# Patient Record
Sex: FEMALE | Race: WHITE | NOT HISPANIC OR LATINO
[De-identification: names, ages, dates, MRNs, and addresses within clinical notes are randomized per-mention and may not be internally consistent; named-entity substitution may affect disease eponyms.]

---

## 2017-04-07 ENCOUNTER — CLINICAL ADVICE (OUTPATIENT)
Age: 62
End: 2017-04-07

## 2017-04-20 ENCOUNTER — APPOINTMENT (OUTPATIENT)
Dept: ORTHOPEDIC SURGERY | Facility: AMBULATORY SURGERY CENTER | Age: 62
End: 2017-04-20

## 2017-04-20 ENCOUNTER — OUTPATIENT (OUTPATIENT)
Dept: OUTPATIENT SERVICES | Facility: HOSPITAL | Age: 62
LOS: 1 days | Discharge: HOME | End: 2017-04-20
Payer: COMMERCIAL

## 2017-04-20 PROCEDURE — 26055 INCISE FINGER TENDON SHEATH: CPT | Mod: F3

## 2017-04-25 DIAGNOSIS — K21.9 GASTRO-ESOPHAGEAL REFLUX DISEASE WITHOUT ESOPHAGITIS: ICD-10-CM

## 2017-04-25 DIAGNOSIS — M13.88 OTHER SPECIFIED ARTHRITIS, OTHER SITE: ICD-10-CM

## 2017-04-25 DIAGNOSIS — Z79.899 OTHER LONG TERM (CURRENT) DRUG THERAPY: ICD-10-CM

## 2017-04-25 DIAGNOSIS — G47.33 OBSTRUCTIVE SLEEP APNEA (ADULT) (PEDIATRIC): ICD-10-CM

## 2017-04-25 DIAGNOSIS — M65.342 TRIGGER FINGER, LEFT RING FINGER: ICD-10-CM

## 2017-04-25 DIAGNOSIS — E03.8 OTHER SPECIFIED HYPOTHYROIDISM: ICD-10-CM

## 2017-04-25 DIAGNOSIS — M65.842 OTHER SYNOVITIS AND TENOSYNOVITIS, LEFT HAND: ICD-10-CM

## 2017-05-01 ENCOUNTER — APPOINTMENT (OUTPATIENT)
Dept: ORTHOPEDIC SURGERY | Facility: CLINIC | Age: 62
End: 2017-05-01

## 2017-05-10 ENCOUNTER — CLINICAL ADVICE (OUTPATIENT)
Age: 62
End: 2017-05-10

## 2017-05-22 ENCOUNTER — APPOINTMENT (OUTPATIENT)
Dept: ORTHOPEDIC SURGERY | Facility: CLINIC | Age: 62
End: 2017-05-22

## 2017-05-22 RX ORDER — CYCLOSPORINE 0.5 MG/ML
0.05 EMULSION OPHTHALMIC
Qty: 60 | Refills: 0 | Status: ACTIVE | COMMUNITY
Start: 2016-10-13

## 2017-05-24 ENCOUNTER — APPOINTMENT (OUTPATIENT)
Dept: CARDIOLOGY | Facility: CLINIC | Age: 62
End: 2017-05-24

## 2017-06-05 ENCOUNTER — APPOINTMENT (OUTPATIENT)
Dept: ORTHOPEDIC SURGERY | Facility: CLINIC | Age: 62
End: 2017-06-05

## 2017-06-05 VITALS — RESPIRATION RATE: 16 BRPM | WEIGHT: 165 LBS | BODY MASS INDEX: 32.39 KG/M2 | HEIGHT: 60 IN

## 2017-06-05 RX ORDER — OXYCODONE HYDROCHLORIDE 30 MG/1
30 TABLET, FILM COATED, EXTENDED RELEASE ORAL
Qty: 60 | Refills: 0 | Status: DISCONTINUED | COMMUNITY
Start: 2017-01-10 | End: 2017-06-05

## 2017-06-05 RX ORDER — METHYLPREDNISOLONE 4 MG/1
4 TABLET ORAL
Qty: 21 | Refills: 0 | Status: DISCONTINUED | COMMUNITY
Start: 2017-05-01 | End: 2017-06-05

## 2017-06-28 ENCOUNTER — CLINICAL ADVICE (OUTPATIENT)
Age: 62
End: 2017-06-28

## 2017-07-17 ENCOUNTER — APPOINTMENT (OUTPATIENT)
Dept: ORTHOPEDIC SURGERY | Facility: CLINIC | Age: 62
End: 2017-07-17

## 2017-07-27 ENCOUNTER — TRANSCRIPTION ENCOUNTER (OUTPATIENT)
Age: 62
End: 2017-07-27

## 2017-08-28 ENCOUNTER — APPOINTMENT (OUTPATIENT)
Dept: ORTHOPEDIC SURGERY | Facility: CLINIC | Age: 62
End: 2017-08-28
Payer: COMMERCIAL

## 2017-08-28 PROCEDURE — 20550 NJX 1 TENDON SHEATH/LIGAMENT: CPT | Mod: F2

## 2017-08-28 PROCEDURE — 99214 OFFICE O/P EST MOD 30 MIN: CPT | Mod: 25

## 2017-11-06 ENCOUNTER — APPOINTMENT (OUTPATIENT)
Dept: ORTHOPEDIC SURGERY | Facility: CLINIC | Age: 62
End: 2017-11-06
Payer: COMMERCIAL

## 2017-11-06 DIAGNOSIS — M65.342 TRIGGER FINGER, LEFT RING FINGER: ICD-10-CM

## 2017-11-06 PROCEDURE — 99214 OFFICE O/P EST MOD 30 MIN: CPT | Mod: 25

## 2017-11-06 PROCEDURE — 73140 X-RAY EXAM OF FINGER(S): CPT | Mod: F8

## 2017-11-06 PROCEDURE — 20550 NJX 1 TENDON SHEATH/LIGAMENT: CPT | Mod: F8

## 2018-01-05 ENCOUNTER — OUTPATIENT (OUTPATIENT)
Dept: OUTPATIENT SERVICES | Facility: HOSPITAL | Age: 63
LOS: 1 days | Discharge: HOME | End: 2018-01-05

## 2018-01-05 DIAGNOSIS — M65.871 OTHER SYNOVITIS AND TENOSYNOVITIS, RIGHT ANKLE AND FOOT: ICD-10-CM

## 2018-01-05 DIAGNOSIS — S69.80XA OTHER SPECIFIED INJURIES OF UNSPECIFIED WRIST, HAND AND FINGER(S), INITIAL ENCOUNTER: ICD-10-CM

## 2018-01-05 DIAGNOSIS — S93.401A SPRAIN OF UNSPECIFIED LIGAMENT OF RIGHT ANKLE, INITIAL ENCOUNTER: ICD-10-CM

## 2018-06-13 ENCOUNTER — APPOINTMENT (OUTPATIENT)
Dept: PLASTIC SURGERY | Facility: CLINIC | Age: 63
End: 2018-06-13
Payer: COMMERCIAL

## 2018-06-13 VITALS — HEIGHT: 60 IN | WEIGHT: 164 LBS | BODY MASS INDEX: 32.2 KG/M2

## 2018-06-13 DIAGNOSIS — M54.16 RADICULOPATHY, LUMBAR REGION: ICD-10-CM

## 2018-06-13 DIAGNOSIS — M54.12 RADICULOPATHY, CERVICAL REGION: ICD-10-CM

## 2018-06-13 DIAGNOSIS — M48.02 SPINAL STENOSIS, CERVICAL REGION: ICD-10-CM

## 2018-06-13 PROCEDURE — 99213 OFFICE O/P EST LOW 20 MIN: CPT | Mod: 25

## 2018-06-13 PROCEDURE — 20550 NJX 1 TENDON SHEATH/LIGAMENT: CPT | Mod: 59

## 2018-06-13 RX ORDER — TIZANIDINE 4 MG/1
4 TABLET ORAL
Qty: 90 | Refills: 0 | Status: DISCONTINUED | COMMUNITY
Start: 2016-12-06 | End: 2018-06-13

## 2018-06-13 RX ORDER — OXYMORPHONE HYDROCHLORIDE 20 MG/1
20 TABLET, EXTENDED RELEASE ORAL
Qty: 60 | Refills: 0 | Status: DISCONTINUED | COMMUNITY
Start: 2016-12-06 | End: 2018-06-13

## 2018-06-13 RX ORDER — TRIAMTERENE AND HYDROCHLOROTHIAZIDE 37.5; 25 MG/1; MG/1
37.5-25 CAPSULE ORAL
Qty: 30 | Refills: 0 | Status: DISCONTINUED | COMMUNITY
Start: 2017-05-16 | End: 2018-06-13

## 2018-06-13 RX ORDER — NAPROXEN SODIUM 500 MG/1
500 TABLET, FILM COATED, EXTENDED RELEASE ORAL
Qty: 60 | Refills: 0 | Status: DISCONTINUED | COMMUNITY
Start: 2017-01-03 | End: 2018-06-13

## 2018-06-13 RX ORDER — ALPRAZOLAM 0.25 MG/1
0.25 TABLET ORAL
Qty: 60 | Refills: 0 | Status: DISCONTINUED | COMMUNITY
Start: 2017-01-31 | End: 2018-06-13

## 2018-07-26 ENCOUNTER — APPOINTMENT (OUTPATIENT)
Dept: PLASTIC SURGERY | Facility: CLINIC | Age: 63
End: 2018-07-26
Payer: COMMERCIAL

## 2018-07-26 PROCEDURE — 20550 NJX 1 TENDON SHEATH/LIGAMENT: CPT

## 2018-09-06 ENCOUNTER — APPOINTMENT (OUTPATIENT)
Dept: PLASTIC SURGERY | Facility: CLINIC | Age: 63
End: 2018-09-06
Payer: COMMERCIAL

## 2018-09-06 DIAGNOSIS — Z86.39 PERSONAL HISTORY OF OTHER ENDOCRINE, NUTRITIONAL AND METABOLIC DISEASE: ICD-10-CM

## 2018-09-06 DIAGNOSIS — R73.03 PREDIABETES.: ICD-10-CM

## 2018-09-06 PROCEDURE — 99212 OFFICE O/P EST SF 10 MIN: CPT | Mod: 25

## 2018-09-06 PROCEDURE — 20550 NJX 1 TENDON SHEATH/LIGAMENT: CPT

## 2018-10-17 ENCOUNTER — APPOINTMENT (OUTPATIENT)
Dept: PLASTIC SURGERY | Facility: CLINIC | Age: 63
End: 2018-10-17

## 2019-03-22 ENCOUNTER — APPOINTMENT (OUTPATIENT)
Dept: PLASTIC SURGERY | Facility: CLINIC | Age: 64
End: 2019-03-22
Payer: COMMERCIAL

## 2019-03-22 PROCEDURE — 99212 OFFICE O/P EST SF 10 MIN: CPT | Mod: 25

## 2019-03-22 PROCEDURE — 20550 NJX 1 TENDON SHEATH/LIGAMENT: CPT

## 2019-03-22 NOTE — PHYSICAL EXAM
[de-identified] : well-appearing, NAD [de-identified] : right hand: well healed volar scar of 4th MCP, no triggering. palpable 3rd digit palmar nodule on flexion and extension with mild triggering and tenderness. small palpable cords of 3rd and 4th palmar ray. Negative Hueston's test; no flexion deformity of MCP or PIP\par left hand: no triggering of 4th digit; palpable nodule with mild triggering of 3rd digit

## 2019-03-22 NOTE — HISTORY OF PRESENT ILLNESS
[FreeTextEntry1] : 62 yo F with PMH of Hypothyroidism and lumbar and cervical radiculopathy RHD with prior trigger finger release of left 4th digit with Dr. Covington in 4/2017 who presents with c/o right middle and 4th digit pain and triggering for approximately 3 weeks associated with stiffness. Patient reports prior steroid injection to right middle finger with improvement. Patient denies any hand trauma. \par \par Interval history (7/26/18): Here for 6 week follow up after kenalog injection to right 3rd and 4th digit.  She reports improvement of her trigger fingers; denies locking/clicking.  She endorses that she has prior kenalog injection to right 3rd digit for a total of 2 injections.  She has new complaint of left 3rd digit triggering and associated tenderness.  Denies fevers, chills, redness. \par \par Interval history (9/6/18): here for interval followup after trigger finger kenalog injection of left 3rd digit.  She reports improvement of triggering symptoms since the steroid injection, but has noted return of increased stiffness and mild triggering.  She denies locking up.  Here to discuss possible steroid treatment of left 3rd digit trigger digit.  Also inquiring whether she might have Dupretryn's disease of the left hand\par \par Interval hx (3/22/19): Here for follow up of c/o right 3rd and 4th digit triggering and associating pain.  also persistent left 3rd digit triggering and discomfort.  Evaluated by Rheum, negative for RA on blood work. Had hand x-rays performed which demonstrated degenerative changes.\par

## 2019-03-22 NOTE — ASSESSMENT
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 surgical trigger finger release release.\par -Recommended TFR; emphasized likelihood of recurrent trigger finger after kenlog injection.  Patient declined surgical intervention at this time for the left 3rd digit trigger finger and right 3rd and 4th digit trigger finger. Desires kenalog injection due to current job situation and will need to plan surgery at a later date.\par -Informed consent obtained\par -Pt tolerated steroid injection\par -Follow up in 6 weeks for discussion of trigger finger release and scheduling

## 2019-03-22 NOTE — PROCEDURE
[Nl] : None [FreeTextEntry1] : right 3rd and 4th trigger finger [FreeTextEntry2] : right 3rd and 4th trigger finger steroid injection [FreeTextEntry3] : cold refrigerant [FreeTextEntry6] : The benefits, risks, and outcomes of kenalog injection were discussed. The risks include infection, hypopigmentation, poor wound healing, fat atrophy, hyperglycemia and dissatisfaction with outcome. The patient understood the risks and elected to proceed with steroid injection.\par \par The right 3rd and 4th finger was prepared in sterile fashion. \par The injection site was identified and marked. \par Triamcinolone 5 mg was injected into the flexor tendon sheath without issue. \par The patient tolerated the procedure.

## 2019-04-17 ENCOUNTER — OUTPATIENT (OUTPATIENT)
Dept: OUTPATIENT SERVICES | Facility: HOSPITAL | Age: 64
LOS: 1 days | Discharge: HOME | End: 2019-04-17
Payer: COMMERCIAL

## 2019-04-17 DIAGNOSIS — H90.A21 SENSORINEURAL HEARING LOSS, UNILATERAL, RIGHT EAR, WITH RESTRICTED HEARING ON THE CONTRALATERAL SIDE: ICD-10-CM

## 2019-04-17 PROCEDURE — 70553 MRI BRAIN STEM W/O & W/DYE: CPT | Mod: 26

## 2019-05-06 ENCOUNTER — APPOINTMENT (OUTPATIENT)
Dept: PLASTIC SURGERY | Facility: CLINIC | Age: 64
End: 2019-05-06

## 2019-08-12 ENCOUNTER — APPOINTMENT (OUTPATIENT)
Dept: PLASTIC SURGERY | Facility: CLINIC | Age: 64
End: 2019-08-12
Payer: COMMERCIAL

## 2019-08-12 DIAGNOSIS — M65.321 TRIGGER FINGER, RIGHT INDEX FINGER: ICD-10-CM

## 2019-08-12 PROCEDURE — 99213 OFFICE O/P EST LOW 20 MIN: CPT | Mod: 25

## 2019-08-12 PROCEDURE — 20550 NJX 1 TENDON SHEATH/LIGAMENT: CPT | Mod: F6

## 2019-08-12 NOTE — PHYSICAL EXAM
[de-identified] : well-appearing, NAD [de-identified] : right hand: well healed volar scar of 4th MCP, + triggering. palpable 3rd digit palmar nodule on flexion and extension with triggering and tenderness; 2nd digit tender palpable nodule +triggering; small palpable cords of 3rd and 4th palmar ray. Negative Hueston's test; no flexion deformity of MCP or PIP\par left hand: no triggering of 4th digit; palpable nodule with no triggering of 3rd digit

## 2019-08-12 NOTE — PROCEDURE
[Nl] : None [FreeTextEntry1] : right 2nd trigger finger [FreeTextEntry2] : right 2nd trigger finger kenalog injection [FreeTextEntry6] : The benefits, risks, and outcomes of kenalog injection were discussed. The risks include infection, hypopigmentation, poor wound healing, fat atrophy, hyperglycemia and dissatisfaction with outcome. The patient understood the risks and elected to proceed with steroid injection.\par \par The right index finger was prepared in sterile fashion. \par The injection site was identified and marked. \par Triamcinolone 1 mL was injected into the flexor tendon sheath without issue. \par The patient tolerated the procedure.

## 2019-08-12 NOTE — ASSESSMENT
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 surgical trigger finger release release.\par -Recommended TFR of right 3rd and 4th trigger finger.  Beneits, risks and outcomes discussed in detail.\par -Regarding right index trigger finger, recommend kenalog injection.\par -Informed consent obtained for right 3rd & 4th trigger finger release under IV sedation as outpatient SAMINA\par -Pt tolerated steroid injection fo right index trigger finger\par -Will schedule SAMINA procedure at patient's earliest convenience after medical/cardiac clearance

## 2019-08-12 NOTE — HISTORY OF PRESENT ILLNESS
[FreeTextEntry1] : 64 yo F with PMH of Hypothyroidism and lumbar and cervical radiculopathy RHD with prior trigger finger release of left 4th digit with Dr. Covington in 4/2017 who presents with c/o right middle and 4th digit pain and triggering for approximately 3 weeks associated with stiffness. Patient reports prior steroid injection to right middle finger with improvement. Patient denies any hand trauma. \par \par Interval history (7/26/18): Here for 6 week follow up after kenalog injection to right 3rd and 4th digit.  She reports improvement of her trigger fingers; denies locking/clicking.  She endorses that she has prior kenalog injection to right 3rd digit for a total of 2 injections.  She has new complaint of left 3rd digit triggering and associated tenderness.  Denies fevers, chills, redness. \par \par Interval history (9/6/18): here for interval followup after trigger finger kenalog injection of left 3rd digit.  She reports improvement of triggering symptoms since the steroid injection, but has noted return of increased stiffness and mild triggering.  She denies locking up.  Here to discuss possible steroid treatment of left 3rd digit trigger digit.  Also inquiring whether she might have Dupretryn's disease of the left hand\par \par Interval hx (3/22/19): Here for follow up of c/o right 3rd and 4th digit triggering and associating pain.  also persistent left 3rd digit triggering and discomfort.  Evaluated by Rheum, negative for RA on blood work. Had hand x-rays performed which demonstrated degenerative changes.\par \par Interval hx (8/12/19): Here for follow up and discussion of surgical treatment options.  Still c/o right 3rd & 4th digit triggering and associated pain.  New complaint of right 2nd digit pain and occasional triggering.  No new changes in health. No ASA

## 2019-08-15 ENCOUNTER — APPOINTMENT (OUTPATIENT)
Dept: CARDIOLOGY | Facility: CLINIC | Age: 64
End: 2019-08-15
Payer: COMMERCIAL

## 2019-08-15 ENCOUNTER — OUTPATIENT (OUTPATIENT)
Dept: OUTPATIENT SERVICES | Facility: HOSPITAL | Age: 64
LOS: 1 days | Discharge: HOME | End: 2019-08-15
Payer: COMMERCIAL

## 2019-08-15 VITALS
TEMPERATURE: 98 F | WEIGHT: 171.52 LBS | SYSTOLIC BLOOD PRESSURE: 137 MMHG | OXYGEN SATURATION: 99 % | RESPIRATION RATE: 15 BRPM | HEART RATE: 16 BPM | DIASTOLIC BLOOD PRESSURE: 75 MMHG

## 2019-08-15 DIAGNOSIS — M65.331 TRIGGER FINGER, RIGHT MIDDLE FINGER: ICD-10-CM

## 2019-08-15 DIAGNOSIS — M65.341 TRIGGER FINGER, RIGHT RING FINGER: ICD-10-CM

## 2019-08-15 DIAGNOSIS — Z90.710 ACQUIRED ABSENCE OF BOTH CERVIX AND UTERUS: Chronic | ICD-10-CM

## 2019-08-15 DIAGNOSIS — Z01.818 ENCOUNTER FOR OTHER PREPROCEDURAL EXAMINATION: ICD-10-CM

## 2019-08-15 LAB
ALBUMIN SERPL ELPH-MCNC: 4.8 G/DL — SIGNIFICANT CHANGE UP (ref 3.5–5.2)
ALP SERPL-CCNC: 66 U/L — SIGNIFICANT CHANGE UP (ref 30–115)
ALT FLD-CCNC: 19 U/L — SIGNIFICANT CHANGE UP (ref 0–41)
ANION GAP SERPL CALC-SCNC: 13 MMOL/L — SIGNIFICANT CHANGE UP (ref 7–14)
AST SERPL-CCNC: 18 U/L — SIGNIFICANT CHANGE UP (ref 0–41)
BASOPHILS # BLD AUTO: 0.06 K/UL — SIGNIFICANT CHANGE UP (ref 0–0.2)
BASOPHILS NFR BLD AUTO: 0.7 % — SIGNIFICANT CHANGE UP (ref 0–1)
BILIRUB SERPL-MCNC: 0.2 MG/DL — SIGNIFICANT CHANGE UP (ref 0.2–1.2)
BUN SERPL-MCNC: 15 MG/DL — SIGNIFICANT CHANGE UP (ref 10–20)
CALCIUM SERPL-MCNC: 9.3 MG/DL — SIGNIFICANT CHANGE UP (ref 8.5–10.1)
CHLORIDE SERPL-SCNC: 103 MMOL/L — SIGNIFICANT CHANGE UP (ref 98–110)
CO2 SERPL-SCNC: 26 MMOL/L — SIGNIFICANT CHANGE UP (ref 17–32)
CREAT SERPL-MCNC: 0.9 MG/DL — SIGNIFICANT CHANGE UP (ref 0.7–1.5)
EOSINOPHIL # BLD AUTO: 0.18 K/UL — SIGNIFICANT CHANGE UP (ref 0–0.7)
EOSINOPHIL NFR BLD AUTO: 2.1 % — SIGNIFICANT CHANGE UP (ref 0–8)
GLUCOSE SERPL-MCNC: 82 MG/DL — SIGNIFICANT CHANGE UP (ref 70–99)
HCT VFR BLD CALC: 42.2 % — SIGNIFICANT CHANGE UP (ref 37–47)
HGB BLD-MCNC: 13.6 G/DL — SIGNIFICANT CHANGE UP (ref 12–16)
IMM GRANULOCYTES NFR BLD AUTO: 0.7 % — HIGH (ref 0.1–0.3)
LYMPHOCYTES # BLD AUTO: 2.45 K/UL — SIGNIFICANT CHANGE UP (ref 1.2–3.4)
LYMPHOCYTES # BLD AUTO: 28.9 % — SIGNIFICANT CHANGE UP (ref 20.5–51.1)
MCHC RBC-ENTMCNC: 28.8 PG — SIGNIFICANT CHANGE UP (ref 27–31)
MCHC RBC-ENTMCNC: 32.2 G/DL — SIGNIFICANT CHANGE UP (ref 32–37)
MCV RBC AUTO: 89.2 FL — SIGNIFICANT CHANGE UP (ref 81–99)
MONOCYTES # BLD AUTO: 0.47 K/UL — SIGNIFICANT CHANGE UP (ref 0.1–0.6)
MONOCYTES NFR BLD AUTO: 5.5 % — SIGNIFICANT CHANGE UP (ref 1.7–9.3)
NEUTROPHILS # BLD AUTO: 5.25 K/UL — SIGNIFICANT CHANGE UP (ref 1.4–6.5)
NEUTROPHILS NFR BLD AUTO: 62.1 % — SIGNIFICANT CHANGE UP (ref 42.2–75.2)
NRBC # BLD: 0 /100 WBCS — SIGNIFICANT CHANGE UP (ref 0–0)
PLATELET # BLD AUTO: 272 K/UL — SIGNIFICANT CHANGE UP (ref 130–400)
POTASSIUM SERPL-MCNC: 4.6 MMOL/L — SIGNIFICANT CHANGE UP (ref 3.5–5)
POTASSIUM SERPL-SCNC: 4.6 MMOL/L — SIGNIFICANT CHANGE UP (ref 3.5–5)
PROT SERPL-MCNC: 7.5 G/DL — SIGNIFICANT CHANGE UP (ref 6–8)
RBC # BLD: 4.73 M/UL — SIGNIFICANT CHANGE UP (ref 4.2–5.4)
RBC # FLD: 13.5 % — SIGNIFICANT CHANGE UP (ref 11.5–14.5)
SODIUM SERPL-SCNC: 142 MMOL/L — SIGNIFICANT CHANGE UP (ref 135–146)
WBC # BLD: 8.47 K/UL — SIGNIFICANT CHANGE UP (ref 4.8–10.8)
WBC # FLD AUTO: 8.47 K/UL — SIGNIFICANT CHANGE UP (ref 4.8–10.8)

## 2019-08-15 PROCEDURE — 93000 ELECTROCARDIOGRAM COMPLETE: CPT

## 2019-08-15 PROCEDURE — 93010 ELECTROCARDIOGRAM REPORT: CPT

## 2019-08-15 PROCEDURE — 99214 OFFICE O/P EST MOD 30 MIN: CPT | Mod: 25

## 2019-08-15 NOTE — H&P PST ADULT - REASON FOR ADMISSION
PT PRESENTS TO PAST WITH NO SOB, CP, PALPITATIONS, DYSURIA, UTI OR URI AT PRESENT.   PT ABLE TO WALK UP 1--  FLIGHTS OF STEPS WITH NO SOB.  AS PER THE PT, THIS IS HIS/HER COMPLETE MEDICAL AND SURGICAL HX, INCLUDING MEDICATIONS PRESCRIBED AND OVER THE COUNTER  PT PRESENTS TO PAST WITH H/O-  RIGHT 3RD AND 4TH TRIGGER FINGER.

## 2019-08-15 NOTE — H&P PST ADULT - RS GEN PE MLT RESP DETAILS PC
normal/no chest wall tenderness/no intercostal retractions/clear to auscultation bilaterally/airway patent/good air movement/breath sounds equal/respirations non-labored

## 2019-08-23 ENCOUNTER — APPOINTMENT (OUTPATIENT)
Dept: CARDIOLOGY | Facility: CLINIC | Age: 64
End: 2019-08-23
Payer: COMMERCIAL

## 2019-08-23 PROCEDURE — 93880 EXTRACRANIAL BILAT STUDY: CPT

## 2019-08-26 NOTE — ASU PATIENT PROFILE, ADULT - PMH
GERD (gastroesophageal reflux disease)    Hypercholesterolemia    Hypothyroidism    OA (osteoarthritis)    BLAINE on CPAP

## 2019-08-27 ENCOUNTER — OUTPATIENT (OUTPATIENT)
Dept: OUTPATIENT SERVICES | Facility: HOSPITAL | Age: 64
LOS: 1 days | Discharge: HOME | End: 2019-08-27

## 2019-08-27 ENCOUNTER — APPOINTMENT (OUTPATIENT)
Dept: PLASTIC SURGERY | Facility: AMBULATORY SURGERY CENTER | Age: 64
End: 2019-08-27
Payer: COMMERCIAL

## 2019-08-27 VITALS
RESPIRATION RATE: 18 BRPM | WEIGHT: 171.52 LBS | SYSTOLIC BLOOD PRESSURE: 133 MMHG | HEART RATE: 80 BPM | DIASTOLIC BLOOD PRESSURE: 67 MMHG | TEMPERATURE: 98 F | OXYGEN SATURATION: 95 %

## 2019-08-27 VITALS
RESPIRATION RATE: 24 BRPM | OXYGEN SATURATION: 95 % | SYSTOLIC BLOOD PRESSURE: 112 MMHG | DIASTOLIC BLOOD PRESSURE: 67 MMHG | HEART RATE: 78 BPM

## 2019-08-27 DIAGNOSIS — Z90.710 ACQUIRED ABSENCE OF BOTH CERVIX AND UTERUS: Chronic | ICD-10-CM

## 2019-08-27 PROCEDURE — 26055 INCISE FINGER TENDON SHEATH: CPT

## 2019-08-27 RX ORDER — OXYCODONE HYDROCHLORIDE 5 MG/1
1 TABLET ORAL
Qty: 0 | Refills: 0 | DISCHARGE

## 2019-08-27 RX ORDER — ROSUVASTATIN CALCIUM 5 MG/1
1 TABLET ORAL
Qty: 0 | Refills: 0 | DISCHARGE

## 2019-08-27 RX ORDER — ESOMEPRAZOLE MAGNESIUM 40 MG/1
1 CAPSULE, DELAYED RELEASE ORAL
Qty: 0 | Refills: 0 | DISCHARGE

## 2019-08-27 RX ORDER — ZOLPIDEM TARTRATE 10 MG/1
1 TABLET ORAL
Qty: 0 | Refills: 0 | DISCHARGE

## 2019-08-27 RX ORDER — HYDROMORPHONE HYDROCHLORIDE 2 MG/ML
0.5 INJECTION INTRAMUSCULAR; INTRAVENOUS; SUBCUTANEOUS
Refills: 0 | Status: DISCONTINUED | OUTPATIENT
Start: 2019-08-27 | End: 2019-08-27

## 2019-08-27 RX ORDER — OXYCODONE AND ACETAMINOPHEN 5; 325 MG/1; MG/1
1 TABLET ORAL ONCE
Refills: 0 | Status: DISCONTINUED | OUTPATIENT
Start: 2019-08-27 | End: 2019-08-27

## 2019-08-27 RX ORDER — PREGABALIN 225 MG/1
1 CAPSULE ORAL
Qty: 0 | Refills: 0 | DISCHARGE

## 2019-08-27 RX ORDER — CHOLECALCIFEROL (VITAMIN D3) 125 MCG
1 CAPSULE ORAL
Qty: 0 | Refills: 0 | DISCHARGE

## 2019-08-27 RX ORDER — LEVOTHYROXINE SODIUM 125 MCG
1 TABLET ORAL
Qty: 0 | Refills: 0 | DISCHARGE

## 2019-08-27 RX ORDER — SODIUM CHLORIDE 9 MG/ML
1000 INJECTION, SOLUTION INTRAVENOUS
Refills: 0 | Status: DISCONTINUED | OUTPATIENT
Start: 2019-08-27 | End: 2019-09-11

## 2019-08-27 RX ORDER — ONDANSETRON 8 MG/1
4 TABLET, FILM COATED ORAL ONCE
Refills: 0 | Status: DISCONTINUED | OUTPATIENT
Start: 2019-08-27 | End: 2019-09-11

## 2019-08-27 RX ADMIN — SODIUM CHLORIDE 100 MILLILITER(S): 9 INJECTION, SOLUTION INTRAVENOUS at 08:59

## 2019-08-27 NOTE — BRIEF OPERATIVE NOTE - NSICDXBRIEFPOSTOP_GEN_ALL_CORE_FT
POST-OP DIAGNOSIS:  Trigger finger 27-Aug-2019 08:25:37 right third and fourth digits Rissa Gonzalez L

## 2019-08-27 NOTE — CHART NOTE - NSCHARTNOTEFT_GEN_A_CORE
PACU ANESTHESIA ADMISSION NOTE      Procedure: Trigger finger release: right 3rd and 4th digits    Post op diagnosis:  Trigger finger      ____  Intubated  TV:______       Rate: ______      FiO2: ______    _x___  Patent Airway    _x___  Full return of protective reflexes    _x___  Full recovery from anesthesia / back to baseline status    Vitals:            T:  97.7              BP :     105/56           R: 18             Sat:98               P:83      Mental Status:  _x___ Awake   _____ Alert   _____ Drowsy   _____ Sedated    Nausea/Vomiting:  _x___  NO       ______Yes,   See Post - Op Orders         Pain Scale (0-10):  __0___    Treatment: _x___ None    ____ See Post - Op/PCA Orders    Post - Operative Fluids:   __x__ Oral   ____ See Post - Op Orders    Plan: Discharge:   _x___Home       _____Floor     _____Critical Care    _____  Other:_________________    Comments:  No anesthesia issues or complications noted.  Discharge when criteria met.

## 2019-08-27 NOTE — BRIEF OPERATIVE NOTE - NSICDXBRIEFPREOP_GEN_ALL_CORE_FT
PRE-OP DIAGNOSIS:  Trigger finger 27-Aug-2019 08:25:29 right third and fourth digits Rissa Gonzalez L

## 2019-08-28 PROBLEM — K21.9 GASTRO-ESOPHAGEAL REFLUX DISEASE WITHOUT ESOPHAGITIS: Chronic | Status: ACTIVE | Noted: 2019-08-15

## 2019-08-28 PROBLEM — E03.9 HYPOTHYROIDISM, UNSPECIFIED: Chronic | Status: ACTIVE | Noted: 2019-08-15

## 2019-08-28 PROBLEM — M19.90 UNSPECIFIED OSTEOARTHRITIS, UNSPECIFIED SITE: Chronic | Status: ACTIVE | Noted: 2019-08-15

## 2019-08-28 PROBLEM — G47.33 OBSTRUCTIVE SLEEP APNEA (ADULT) (PEDIATRIC): Chronic | Status: ACTIVE | Noted: 2019-08-15

## 2019-08-28 PROBLEM — E78.00 PURE HYPERCHOLESTEROLEMIA, UNSPECIFIED: Chronic | Status: ACTIVE | Noted: 2019-08-15

## 2019-08-29 ENCOUNTER — APPOINTMENT (OUTPATIENT)
Dept: PLASTIC SURGERY | Facility: CLINIC | Age: 64
End: 2019-08-29
Payer: COMMERCIAL

## 2019-08-29 DIAGNOSIS — G47.33 OBSTRUCTIVE SLEEP APNEA (ADULT) (PEDIATRIC): ICD-10-CM

## 2019-08-29 DIAGNOSIS — K21.9 GASTRO-ESOPHAGEAL REFLUX DISEASE WITHOUT ESOPHAGITIS: ICD-10-CM

## 2019-08-29 DIAGNOSIS — M65.331 TRIGGER FINGER, RIGHT MIDDLE FINGER: ICD-10-CM

## 2019-08-29 DIAGNOSIS — M19.90 UNSPECIFIED OSTEOARTHRITIS, UNSPECIFIED SITE: ICD-10-CM

## 2019-08-29 DIAGNOSIS — E78.00 PURE HYPERCHOLESTEROLEMIA, UNSPECIFIED: ICD-10-CM

## 2019-08-29 DIAGNOSIS — M65.332 TRIGGER FINGER, LEFT MIDDLE FINGER: ICD-10-CM

## 2019-08-29 DIAGNOSIS — E03.9 HYPOTHYROIDISM, UNSPECIFIED: ICD-10-CM

## 2019-08-29 DIAGNOSIS — M65.341 TRIGGER FINGER, RIGHT RING FINGER: ICD-10-CM

## 2019-08-29 PROCEDURE — 99024 POSTOP FOLLOW-UP VISIT: CPT

## 2019-08-29 NOTE — ASSESSMENT
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

## 2019-08-29 NOTE — HISTORY OF PRESENT ILLNESS
[FreeTextEntry1] : 62 yo F with PMH of Hypothyroidism and lumbar and cervical radiculopathy RHD with prior trigger finger release of left 4th digit with Dr. Covington in 4/2017 who presents with c/o right middle and 4th digit pain and triggering for approximately 3 weeks associated with stiffness. Patient reports prior steroid injection to right middle finger with improvement. Patient denies any hand trauma. \par \par Interval history (7/26/18): Here for 6 week follow up after kenalog injection to right 3rd and 4th digit.  She reports improvement of her trigger fingers; denies locking/clicking.  She endorses that she has prior kenalog injection to right 3rd digit for a total of 2 injections.  She has new complaint of left 3rd digit triggering and associated tenderness.  Denies fevers, chills, redness. \par \par Interval history (9/6/18): here for interval followup after trigger finger kenalog injection of left 3rd digit.  She reports improvement of triggering symptoms since the steroid injection, but has noted return of increased stiffness and mild triggering.  She denies locking up.  Here to discuss possible steroid treatment of left 3rd digit trigger digit.  Also inquiring whether she might have Dupretryn's disease of the left hand\par \par Interval hx (3/22/19): Here for follow up of c/o right 3rd and 4th digit triggering and associating pain.  also persistent left 3rd digit triggering and discomfort.  Evaluated by Rheum, negative for RA on blood work. Had hand x-rays performed which demonstrated degenerative changes.\par \par Interval hx (8/12/19): Here for follow up and discussion of surgical treatment options.  Still c/o right 3rd & 4th digit triggering and associated pain.  New complaint of right 2nd digit pain and occasional triggering.  No new changes in health. No ASA.\par \par Interval hx (8/29/19): Pt presents for postop check and bandage change- POD #2 s/p right 3rd and 4th TFR. C/o pain moderately controlled with home pain medication (Oxycodone 20mg q8h) as well as swelling and bruising. Denies f/c.

## 2019-08-29 NOTE — PHYSICAL EXAM
[de-identified] : well-appearing, NAD [de-identified] : right hand: well healed volar scar of 4th MCP, + triggering. palpable 3rd digit palmar nodule on flexion and extension with triggering and tenderness; 2nd digit tender palpable nodule +triggering; small palpable cords of 3rd and 4th palmar ray. Negative Hueston's test; no flexion deformity of MCP or PIP\par left hand: no triggering of 4th digit; palpable nodule with no triggering of 3rd digit

## 2019-09-04 ENCOUNTER — APPOINTMENT (OUTPATIENT)
Dept: PLASTIC SURGERY | Facility: CLINIC | Age: 64
End: 2019-09-04
Payer: COMMERCIAL

## 2019-09-04 PROCEDURE — 99024 POSTOP FOLLOW-UP VISIT: CPT

## 2019-09-04 NOTE — HISTORY OF PRESENT ILLNESS
[FreeTextEntry1] : 62 yo F with PMH of Hypothyroidism and lumbar and cervical radiculopathy RHD with prior trigger finger release of left 4th digit with Dr. Covington in 4/2017 who presents with c/o right middle and 4th digit pain and triggering for approximately 3 weeks associated with stiffness. Patient reports prior steroid injection to right middle finger with improvement. Patient denies any hand trauma. \par \par Interval history (7/26/18): Here for 6 week follow up after kenalog injection to right 3rd and 4th digit.  She reports improvement of her trigger fingers; denies locking/clicking.  She endorses that she has prior kenalog injection to right 3rd digit for a total of 2 injections.  She has new complaint of left 3rd digit triggering and associated tenderness.  Denies fevers, chills, redness. \par \par Interval history (9/6/18): here for interval followup after trigger finger kenalog injection of left 3rd digit.  She reports improvement of triggering symptoms since the steroid injection, but has noted return of increased stiffness and mild triggering.  She denies locking up.  Here to discuss possible steroid treatment of left 3rd digit trigger digit.  Also inquiring whether she might have Dupretryn's disease of the left hand\par \par Interval hx (3/22/19): Here for follow up of c/o right 3rd and 4th digit triggering and associating pain.  also persistent left 3rd digit triggering and discomfort.  Evaluated by Rheum, negative for RA on blood work. Had hand x-rays performed which demonstrated degenerative changes.\par \par Interval hx (8/12/19): Here for follow up and discussion of surgical treatment options.  Still c/o right 3rd & 4th digit triggering and associated pain.  New complaint of right 2nd digit pain and occasional triggering.  No new changes in health. No ASA.\par \par Interval hx (8/29/19): Pt presents for postop check and bandage change- POD #2 s/p right 3rd and 4th TFR. C/o pain moderately controlled with home pain medication (Oxycodone 20mg q8h) as well as swelling and bruising. Denies f/c.\par \par Interval hx (9/4/19): Pt presents for f/u- POD #8.

## 2019-09-04 NOTE — ASSESSMENT
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

## 2019-09-04 NOTE — PHYSICAL EXAM
[de-identified] : well-appearing, NAD [de-identified] : right hand: well healed volar scar of 4th MCP, healing incisions over 3rd and 4th A1 pulleys, no s/s cellulitis, mild swelling and tenderness as expected; 2nd digit tender palpable nodule +triggering; small palpable cords of 3rd and 4th palmar ray. Negative Hueston's test; no flexion deformity of MCP or PIP\par left hand: no triggering of 4th digit; palpable nodule with no triggering of 3rd digit

## 2019-09-09 ENCOUNTER — APPOINTMENT (OUTPATIENT)
Dept: CARDIOLOGY | Facility: CLINIC | Age: 64
End: 2019-09-09

## 2019-09-11 ENCOUNTER — APPOINTMENT (OUTPATIENT)
Dept: PLASTIC SURGERY | Facility: CLINIC | Age: 64
End: 2019-09-11
Payer: COMMERCIAL

## 2019-09-11 PROCEDURE — 99024 POSTOP FOLLOW-UP VISIT: CPT

## 2019-09-11 NOTE — HISTORY OF PRESENT ILLNESS
[FreeTextEntry1] : 65 yo F with PMH of Hypothyroidism and lumbar and cervical radiculopathy RHD with prior trigger finger release of left 4th digit with Dr. Covington in 4/2017 who presents with c/o right middle and 4th digit pain and triggering for approximately 3 weeks associated with stiffness. Patient reports prior steroid injection to right middle finger with improvement. Patient denies any hand trauma. \par \par Interval history (7/26/18): Here for 6 week follow up after kenalog injection to right 3rd and 4th digit.  She reports improvement of her trigger fingers; denies locking/clicking.  She endorses that she has prior kenalog injection to right 3rd digit for a total of 2 injections.  She has new complaint of left 3rd digit triggering and associated tenderness.  Denies fevers, chills, redness. \par \par Interval history (9/6/18): here for interval followup after trigger finger kenalog injection of left 3rd digit.  She reports improvement of triggering symptoms since the steroid injection, but has noted return of increased stiffness and mild triggering.  She denies locking up.  Here to discuss possible steroid treatment of left 3rd digit trigger digit.  Also inquiring whether she might have Dupretryn's disease of the left hand\par \par Interval hx (3/22/19): Here for follow up of c/o right 3rd and 4th digit triggering and associating pain.  also persistent left 3rd digit triggering and discomfort.  Evaluated by Rheum, negative for RA on blood work. Had hand x-rays performed which demonstrated degenerative changes.\par \par Interval hx (8/12/19): Here for follow up and discussion of surgical treatment options.  Still c/o right 3rd & 4th digit triggering and associated pain.  New complaint of right 2nd digit pain and occasional triggering.  No new changes in health. No ASA.\par \par Interval hx (9/11/19): Pt presents today POD #13 s/p right 3rd and 4th TFR c/o improving incisional discomfort. Compliant with hand rest and elevation. Denies f/c.

## 2019-09-11 NOTE — PHYSICAL EXAM
[de-identified] : right hand: healing incisions over 3rd and 4th A1 pulley, clean, dry and intact with swelling as expected, no erythema or cellulitic changes, limited flexion and extension of digits secondary to soft tissue swelling \par left hand: no triggering of 4th digit; palpable nodule with no triggering of 3rd digit  [de-identified] : well-appearing, very anxious

## 2019-09-11 NOTE — ASSESSMENT
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 Shah

## 2019-10-03 ENCOUNTER — APPOINTMENT (OUTPATIENT)
Dept: PLASTIC SURGERY | Facility: CLINIC | Age: 64
End: 2019-10-03
Payer: COMMERCIAL

## 2019-10-03 PROCEDURE — 99024 POSTOP FOLLOW-UP VISIT: CPT

## 2019-10-03 NOTE — ASSESSMENT
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 Shah as previously scheduled

## 2019-10-03 NOTE — PHYSICAL EXAM
[de-identified] : well-appearing, very anxious  [de-identified] : right hand: healed incisions over 3rd and 4th A1 pulley, clean, dry and intact with diffuse swelling of entire hand, no erythema or cellulitic changes, limited flexion and extension of digits secondary to soft tissue swelling, intact sensation \par left hand: no triggering of 4th digit; palpable nodule with no triggering of 3rd digit

## 2019-10-03 NOTE — HISTORY OF PRESENT ILLNESS
[FreeTextEntry1] : 65 yo F with PMH of Hypothyroidism and lumbar and cervical radiculopathy RHD with prior trigger finger release of left 4th digit with Dr. Covington in 4/2017 who presents with c/o right middle and 4th digit pain and triggering for approximately 3 weeks associated with stiffness. Patient reports prior steroid injection to right middle finger with improvement. Patient denies any hand trauma. \par \par Interval history (7/26/18): Here for 6 week follow up after kenalog injection to right 3rd and 4th digit.  She reports improvement of her trigger fingers; denies locking/clicking.  She endorses that she has prior kenalog injection to right 3rd digit for a total of 2 injections.  She has new complaint of left 3rd digit triggering and associated tenderness.  Denies fevers, chills, redness. \par \par Interval history (9/6/18): here for interval followup after trigger finger kenalog injection of left 3rd digit.  She reports improvement of triggering symptoms since the steroid injection, but has noted return of increased stiffness and mild triggering.  She denies locking up.  Here to discuss possible steroid treatment of left 3rd digit trigger digit.  Also inquiring whether she might have Dupretryn's disease of the left hand\par \par Interval hx (3/22/19): Here for follow up of c/o right 3rd and 4th digit triggering and associating pain.  also persistent left 3rd digit triggering and discomfort.  Evaluated by Rheum, negative for RA on blood work. Had hand x-rays performed which demonstrated degenerative changes.\par \par Interval hx (8/12/19): Here for follow up and discussion of surgical treatment options.  Still c/o right 3rd & 4th digit triggering and associated pain.  New complaint of right 2nd digit pain and occasional triggering.  No new changes in health. No ASA.\par \par Interval hx (9/11/19): Pt presents today POD #13 s/p right 3rd and 4th TFR c/o improving incisional discomfort. Compliant with hand rest and elevation. Denies f/c.\par \par Interval hx (10/3/19). Patient presents today 5 weeks post-op c/o persistent right hand swelling despite elevation. Compliant with hand rest, and OT with minimal improvement.

## 2019-10-04 ENCOUNTER — APPOINTMENT (OUTPATIENT)
Dept: CARDIOLOGY | Facility: CLINIC | Age: 64
End: 2019-10-04

## 2019-10-15 ENCOUNTER — APPOINTMENT (OUTPATIENT)
Dept: CARDIOLOGY | Facility: CLINIC | Age: 64
End: 2019-10-15

## 2019-10-16 ENCOUNTER — APPOINTMENT (OUTPATIENT)
Dept: CARDIOLOGY | Facility: CLINIC | Age: 64
End: 2019-10-16

## 2019-10-18 ENCOUNTER — APPOINTMENT (OUTPATIENT)
Dept: PLASTIC SURGERY | Facility: CLINIC | Age: 64
End: 2019-10-18
Payer: COMMERCIAL

## 2019-10-18 DIAGNOSIS — M65.331 TRIGGER FINGER, RIGHT MIDDLE FINGER: ICD-10-CM

## 2019-10-18 DIAGNOSIS — M65.341 TRIGGER FINGER, RIGHT RING FINGER: ICD-10-CM

## 2019-10-18 PROCEDURE — 99024 POSTOP FOLLOW-UP VISIT: CPT

## 2019-10-18 NOTE — HISTORY OF PRESENT ILLNESS
[FreeTextEntry1] : 65 yo F with PMH of Hypothyroidism and lumbar and cervical radiculopathy RHD with prior trigger finger release of left 4th digit with Dr. Covington in 4/2017 who presents with c/o right middle and 4th digit pain and triggering for approximately 3 weeks associated with stiffness. Patient reports prior steroid injection to right middle finger with improvement. Patient denies any hand trauma. \par \par Interval history (7/26/18): Here for 6 week follow up after kenalog injection to right 3rd and 4th digit.  She reports improvement of her trigger fingers; denies locking/clicking.  She endorses that she has prior kenalog injection to right 3rd digit for a total of 2 injections.  She has new complaint of left 3rd digit triggering and associated tenderness.  Denies fevers, chills, redness. \par \par Interval history (9/6/18): here for interval followup after trigger finger kenalog injection of left 3rd digit.  She reports improvement of triggering symptoms since the steroid injection, but has noted return of increased stiffness and mild triggering.  She denies locking up.  Here to discuss possible steroid treatment of left 3rd digit trigger digit.  Also inquiring whether she might have Dupretryn's disease of the left hand\par \par Interval hx (3/22/19): Here for follow up of c/o right 3rd and 4th digit triggering and associating pain.  also persistent left 3rd digit triggering and discomfort.  Evaluated by Rheum, negative for RA on blood work. Had hand x-rays performed which demonstrated degenerative changes.\par \par Interval hx (8/12/19): Here for follow up and discussion of surgical treatment options.  Still c/o right 3rd & 4th digit triggering and associated pain.  New complaint of right 2nd digit pain and occasional triggering.  No new changes in health. No ASA.\par \par Interval hx (9/11/19): Pt presents today POD #13 s/p right 3rd and 4th TFR c/o improving incisional discomfort. Compliant with hand rest and elevation. Denies f/c.\par \par Interval hx (10/3/19). Patient presents today 5 weeks post-op c/o persistent right hand swelling despite elevation. Compliant with hand rest, and OT with minimal improvement. \par \par Interval hx (10/18/19): Here for 7 week post-op visit s/p right 3rd and 4th TFR.  She reports moderate  improvement of right hand swelling after Sodumedrol pack.  Denies acute hand pain, fevers, chills, redness.  Compliant with OHT who reports significant progress compared to history of left hand with similar post-op healing.

## 2019-10-18 NOTE — ASSESSMENT
[FreeTextEntry1] : 65 yo F with h/o left 4th trigger s/p trigger release\par Possible early Dupuytren of left 3rd/4th palmar ray, no flexion deformity\par New right index trigger finger, s/p Kenalog injection x1\par Persistent right 3rd and 4th trigger fingers s/p 2nd Kenalog injection, now 5 weeks s/p R 3rd and 4th TFR\par \par -Patient reassured\par -Hand elevation\par -renewed Hand OT for further AROM/PROM therapy\par -encouraged greater use of right hand for ADLs\par -start silicone sheeting therapy to incisions at night x 3 months\par -follow up in 6 weeks

## 2019-10-18 NOTE — PHYSICAL EXAM
[de-identified] : well-appearing, very anxious  [de-identified] : right hand: healed incisions over 3rd and 4th A1 pulley healing well, moderatte diffuse swelling of entire hand, no erythema or cellulitic changes, moderate improvement of flexion and extension of digits secondary to soft tissue swelling, intact sensation\par left hand: no triggering of 4th digit; palpable nodule with no triggering of 3rd digit

## 2019-11-08 ENCOUNTER — APPOINTMENT (OUTPATIENT)
Dept: PLASTIC SURGERY | Facility: CLINIC | Age: 64
End: 2019-11-08
Payer: COMMERCIAL

## 2019-11-08 PROCEDURE — 99024 POSTOP FOLLOW-UP VISIT: CPT

## 2019-11-08 NOTE — HISTORY OF PRESENT ILLNESS
[FreeTextEntry1] : 63 yo F with PMH of Hypothyroidism and lumbar and cervical radiculopathy RHD with prior trigger finger release of left 4th digit with Dr. Covington in 4/2017 who presents with c/o right middle and 4th digit pain and triggering for approximately 3 weeks associated with stiffness. Patient reports prior steroid injection to right middle finger with improvement. Patient denies any hand trauma. \par \par Interval history (7/26/18): Here for 6 week follow up after kenalog injection to right 3rd and 4th digit.  She reports improvement of her trigger fingers; denies locking/clicking.  She endorses that she has prior kenalog injection to right 3rd digit for a total of 2 injections.  She has new complaint of left 3rd digit triggering and associated tenderness.  Denies fevers, chills, redness. \par \par Interval history (9/6/18): here for interval followup after trigger finger kenalog injection of left 3rd digit.  She reports improvement of triggering symptoms since the steroid injection, but has noted return of increased stiffness and mild triggering.  She denies locking up.  Here to discuss possible steroid treatment of left 3rd digit trigger digit.  Also inquiring whether she might have Dupretryn's disease of the left hand\par \par Interval hx (3/22/19): Here for follow up of c/o right 3rd and 4th digit triggering and associating pain.  also persistent left 3rd digit triggering and discomfort.  Evaluated by Rheum, negative for RA on blood work. Had hand x-rays performed which demonstrated degenerative changes.\par \par Interval hx (8/12/19): Here for follow up and discussion of surgical treatment options.  Still c/o right 3rd & 4th digit triggering and associated pain.  New complaint of right 2nd digit pain and occasional triggering.  No new changes in health. No ASA.\par \par Interval hx (9/11/19): Pt presents today POD #13 s/p right 3rd and 4th TFR c/o improving incisional discomfort. Compliant with hand rest and elevation. Denies f/c.\par \par Interval hx (10/3/19). Patient presents today 5 weeks post-op c/o persistent right hand swelling despite elevation. Compliant with hand rest, and OT with minimal improvement. \par \par Interval hx (10/18/19): Here for 7 week post-op visit s/p right 3rd and 4th TFR.  She reports moderate  improvement of right hand swelling after Sodumedrol pack.  Denies acute hand pain, fevers, chills, redness.  Compliant with OHT who reports significant progress compared to history of left hand with similar post-op healing.\par \par Interval hx (11/8/19):  Here for follow up. reports slow but progressive improvement of right hand swelling.  Increasing ROM. Compliant with silicone sheeting to incisions.

## 2019-11-08 NOTE — ASSESSMENT
[FreeTextEntry1] : 63 yo F with h/o left 4th trigger s/p trigger release\par Possible early Dupuytren of left 3rd/4th palmar ray, no flexion deformity\par New right index trigger finger, s/p Kenalog injection x1\par Persistent right 3rd and 4th trigger fingers s/p 2nd Kenalog injection, now 11 weeks s/p R 3rd and 4th TFR\par \par -Patient reassured; pt agrees that right hand post-op healing much faster than contralateral hand post-op recovery history.\par -c/w  Hand OT for further AROM/PROM therapy\par -c/w right hand for ADLs and aggressive scar masssage\par -c/w silicone sheeting therapy to incisions\par -follow up in 4 weeks after last OHT visit for possible OHT renewal and scar kenalog injection

## 2019-11-08 NOTE — PHYSICAL EXAM
[de-identified] : well-appearing, very anxious  [de-identified] : right hand: healed incisions over 3rd and 4th A1 pulley healing well, moderate diffuse swelling of entire hand, no erythema or cellulitic changes, continuing improvement of flexion and extension of digits secondary to soft tissue swelling, intact sensation\par left hand: no triggering of 4th digit; palpable nodule with no triggering of 3rd digit

## 2019-12-06 ENCOUNTER — APPOINTMENT (OUTPATIENT)
Dept: CARDIOLOGY | Facility: CLINIC | Age: 64
End: 2019-12-06
Payer: COMMERCIAL

## 2019-12-06 PROCEDURE — 93351 STRESS TTE COMPLETE: CPT

## 2019-12-06 PROCEDURE — 93325 DOPPLER ECHO COLOR FLOW MAPG: CPT

## 2019-12-06 PROCEDURE — 93320 DOPPLER ECHO COMPLETE: CPT

## 2019-12-09 ENCOUNTER — APPOINTMENT (OUTPATIENT)
Dept: PLASTIC SURGERY | Facility: CLINIC | Age: 64
End: 2019-12-09
Payer: COMMERCIAL

## 2019-12-09 DIAGNOSIS — M25.631 STIFFNESS OF RIGHT WRIST, NOT ELSEWHERE CLASSIFIED: ICD-10-CM

## 2019-12-09 DIAGNOSIS — M25.642 STIFFNESS OF LEFT HAND, NOT ELSEWHERE CLASSIFIED: ICD-10-CM

## 2019-12-09 PROCEDURE — 99213 OFFICE O/P EST LOW 20 MIN: CPT

## 2019-12-09 NOTE — HISTORY OF PRESENT ILLNESS
[FreeTextEntry1] : 63 yo F with PMH of Hypothyroidism and lumbar and cervical radiculopathy RHD with prior trigger finger release of left 4th digit with Dr. Covington in 4/2017 who presents with c/o right middle and 4th digit pain and triggering for approximately 3 weeks associated with stiffness. Patient reports prior steroid injection to right middle finger with improvement. Patient denies any hand trauma. \par \par Interval history (7/26/18): Here for 6 week follow up after kenalog injection to right 3rd and 4th digit.  She reports improvement of her trigger fingers; denies locking/clicking.  She endorses that she has prior kenalog injection to right 3rd digit for a total of 2 injections.  She has new complaint of left 3rd digit triggering and associated tenderness.  Denies fevers, chills, redness. \par \par Interval history (9/6/18): here for interval followup after trigger finger kenalog injection of left 3rd digit.  She reports improvement of triggering symptoms since the steroid injection, but has noted return of increased stiffness and mild triggering.  She denies locking up.  Here to discuss possible steroid treatment of left 3rd digit trigger digit.  Also inquiring whether she might have Dupretryn's disease of the left hand\par \par Interval hx (3/22/19): Here for follow up of c/o right 3rd and 4th digit triggering and associating pain.  also persistent left 3rd digit triggering and discomfort.  Evaluated by Rheum, negative for RA on blood work. Had hand x-rays performed which demonstrated degenerative changes.\par \par Interval hx (8/12/19): Here for follow up and discussion of surgical treatment options.  Still c/o right 3rd & 4th digit triggering and associated pain.  New complaint of right 2nd digit pain and occasional triggering.  No new changes in health. No ASA.\par \par Interval hx (9/11/19): Pt presents today POD #13 s/p right 3rd and 4th TFR c/o improving incisional discomfort. Compliant with hand rest and elevation. Denies f/c.\par \par Interval hx (10/3/19). Patient presents today 5 weeks post-op c/o persistent right hand swelling despite elevation. Compliant with hand rest, and OT with minimal improvement. \par \par Interval hx (10/18/19): Here for 7 week post-op visit s/p right 3rd and 4th TFR.  She reports moderate  improvement of right hand swelling after Sodumedrol pack.  Denies acute hand pain, fevers, chills, redness.  Compliant with OHT who reports significant progress compared to history of left hand with similar post-op healing.\par \par Interval hx (11/8/19):  Here for follow up. reports slow but progressive improvement of right hand swelling.  Increasing ROM. Compliant with silicone sheeting to incisions.\par \par Interval hx (12/9/19):  Here for follow up. Reports improvement of right hand swelling and ROM with hand compression sleeve and ongoing OHT.  She reports needing OHT rx renewal.  compliant with silicone sheeting to incisions and scar massage.  Feels right wrist is weak with lifitng and wrist flexion.  Also new c/o left middle finger triggering, occasional.  Inquiring about kenalog injection.

## 2019-12-09 NOTE — ASSESSMENT
[FreeTextEntry1] : 63 yo F with h/o left 4th trigger s/p trigger release\par Possible early Dupuytren of left 3rd/4th palmar ray, no flexion deformity; no triggering today\par right index trigger finger, s/p Kenalog injection x1, asymptomatic\par Persistent right 3rd and 4th trigger fingers s/p 2nd Kenalog injection, now 15 weeks s/p R 3rd and 4th TFR\par \par -pt reassured; no indication for left middle finger kenalog injection for no clinical evidence of active TF and right TFR scar improving\par -c/w  Hand OT for further AROM/PROM therapy; new rx given with additional of wrist therapy (ROM/strengthening)\par -c/w right hand for ADLs and aggressive scar massage\par -c/w silicone sheeting therapy to incisions\par -follow up in 4 weeks for right middle TFR scar and left middle finger re-evaluation

## 2019-12-09 NOTE — PHYSICAL EXAM
[de-identified] : well-appearing, very anxious  [de-identified] : right hand: healed incisions over 3rd and 4th A1 pulley healing well with 3rd digit incision improving with flattening and less tenderness; minimal diffuse swelling of entire hand, no erythema or cellulitic changes, continuing improvement of flexion and extension of digits secondary to soft tissue swelling, intact sensation\par left hand: no triggering of 3rd or 4th digit; palpable small nodule with no triggering of 3rd digit

## 2020-01-06 ENCOUNTER — APPOINTMENT (OUTPATIENT)
Dept: PLASTIC SURGERY | Facility: CLINIC | Age: 65
End: 2020-01-06
Payer: COMMERCIAL

## 2020-01-06 DIAGNOSIS — R20.0 ANESTHESIA OF SKIN: ICD-10-CM

## 2020-01-06 PROCEDURE — 99213 OFFICE O/P EST LOW 20 MIN: CPT

## 2020-01-06 NOTE — HISTORY OF PRESENT ILLNESS
[FreeTextEntry1] : 65 yo F with PMH of Hypothyroidism and lumbar and cervical radiculopathy RHD with prior trigger finger release of left 4th digit with Dr. Covington in 4/2017 who presents with c/o right middle and 4th digit pain and triggering for approximately 3 weeks associated with stiffness. Patient reports prior steroid injection to right middle finger with improvement. Patient denies any hand trauma. \par \par Interval history (7/26/18): Here for 6 week follow up after kenalog injection to right 3rd and 4th digit.  She reports improvement of her trigger fingers; denies locking/clicking.  She endorses that she has prior kenalog injection to right 3rd digit for a total of 2 injections.  She has new complaint of left 3rd digit triggering and associated tenderness.  Denies fevers, chills, redness. \par \par Interval history (9/6/18): here for interval followup after trigger finger kenalog injection of left 3rd digit.  She reports improvement of triggering symptoms since the steroid injection, but has noted return of increased stiffness and mild triggering.  She denies locking up.  Here to discuss possible steroid treatment of left 3rd digit trigger digit.  Also inquiring whether she might have Dupretryn's disease of the left hand\par \par Interval hx (3/22/19): Here for follow up of c/o right 3rd and 4th digit triggering and associating pain.  also persistent left 3rd digit triggering and discomfort.  Evaluated by Rheum, negative for RA on blood work. Had hand x-rays performed which demonstrated degenerative changes.\par \par Interval hx (8/12/19): Here for follow up and discussion of surgical treatment options.  Still c/o right 3rd & 4th digit triggering and associated pain.  New complaint of right 2nd digit pain and occasional triggering.  No new changes in health. No ASA.\par \par Interval hx (9/11/19): Pt presents today POD #13 s/p right 3rd and 4th TFR c/o improving incisional discomfort. Compliant with hand rest and elevation. Denies f/c.\par \par Interval hx (10/3/19). Patient presents today 5 weeks post-op c/o persistent right hand swelling despite elevation. Compliant with hand rest, and OT with minimal improvement. \par \par Interval hx (10/18/19): Here for 7 week post-op visit s/p right 3rd and 4th TFR.  She reports moderate  improvement of right hand swelling after Sodumedrol pack.  Denies acute hand pain, fevers, chills, redness.  Compliant with OHT who reports significant progress compared to history of left hand with similar post-op healing.\par \par Interval hx (11/8/19):  Here for follow up. reports slow but progressive improvement of right hand swelling.  Increasing ROM. Compliant with silicone sheeting to incisions.\par \par Interval hx (12/9/19):  Here for follow up. Reports improvement of right hand swelling and ROM with hand compression sleeve and ongoing OHT.  She reports needing OHT rx renewal.  compliant with silicone sheeting to incisions and scar massage.  Feels right wrist is weak with lifitng and wrist flexion.  Also new c/o left middle finger triggering, occasional.  Inquiring about kenalog injection.\par \par Interval hx (1/6/20: Pt presents for f/u- 4.5 months s/p R 3rd and 4th TFR. Still going to OT with improving strength and decreasing scar tenderness and c/o mild paresthesias to right hand x2 weeks. No EMG testing done.  No prior history of similar paresthesia complaints.  No other complaints.

## 2020-01-06 NOTE — ASSESSMENT
[FreeTextEntry1] : 65 yo F with h/o left 4th trigger s/p trigger release\par Possible early Dupuytren of left 3rd/4th palmar ray, no flexion deformity; no triggering today\par right index trigger finger, s/p Kenalog injection x1, asymptomatic\par Persistent right 3rd and 4th trigger fingers s/p 2nd Kenalog injection, now 4.5 months s/p R 3rd and 4th TFR, doing well\par Possible early right CTS\par \par -c/w  Hand OT for further AROM/PROM therapy until MMI\par -right hand scar massage\par -right hand nighttime carpal tunnel splint x 6 weeks\par -will re-evaluate after conservative mgmt with splint before considering EMG/NCT and/or possible kenalog injection\par -all questions were answered to patient and 's apparent satisfaction\par -f/u 6 weeks

## 2020-01-06 NOTE — PHYSICAL EXAM
[de-identified] : right hand: well-healed incisions over 3rd and 4th A1 pulley, scars softer and flat, no significant swelling, continuing improvement of ROM, positive Phalens, negative Tinels and grind test, 5/5 strength APB, 4/5  strength, SILT r/u/m\par left hand: no triggering of 3rd or 4th digit; palpable small nodule with no triggering of 3rd digit, 5/5  strength, negative Phalens/Tinels/grind, SILT r/u/m [de-identified] : well-appearing, very anxious

## 2020-02-15 ENCOUNTER — TRANSCRIPTION ENCOUNTER (OUTPATIENT)
Age: 65
End: 2020-02-15

## 2020-02-24 ENCOUNTER — APPOINTMENT (OUTPATIENT)
Dept: PLASTIC SURGERY | Facility: CLINIC | Age: 65
End: 2020-02-24
Payer: COMMERCIAL

## 2020-02-24 PROCEDURE — 99212 OFFICE O/P EST SF 10 MIN: CPT | Mod: 25

## 2020-02-24 PROCEDURE — 20526 THER INJECTION CARP TUNNEL: CPT

## 2020-02-24 NOTE — PROCEDURE
[FreeTextEntry1] : right carpal tunnel syndrome [FreeTextEntry2] : kenalog injection right wrist [FreeTextEntry6] : The benefits, risks, and outcomes of kenalog injection were discussed. The risks include infection, hypopigmentation, poor wound healing, fat atrophy, and dissatisfaction with outcome. The patient understood the risks and elected to proceed with steroid injection.\par \par Informed consent obtained\par \par The right wrist was prepared in sterile fashion. \par The injection site was identified and marked. \par Triamcinolone 5 mg was injected into the carpal tunnel without issue. \par The patient tolerated the procedure.

## 2020-02-24 NOTE — ASSESSMENT
[FreeTextEntry1] : 65 yo F with h/o left 4th trigger s/p trigger release\par Possible early Dupuytren of left 3rd/4th palmar ray, no flexion deformity; no triggering today\par right index trigger finger, s/p Kenalog injection x1, asymptomatic\par Persistent right 3rd and 4th trigger fingers s/p 2nd Kenalog injection, now 6 months s/p R 3rd and 4th TFR, doing well\par \par Possible right neck radiculopathy\par Right CTS, mild\par s/p right wrist kenalog injection\par \par -Recommend kenalog injection to right wrist; pt tolerated the procedure\par -c/w  Hand OT for further AROM/PROM therapy until MMI\par -c/w right hand scar massage\par -c/w right hand nighttime carpal tunnel splint x 6 weeks\par -obtain neck MRI as ordered by pain mgmt\par -all questions were answered to patient's apparent satisfaction\par -f/u 6 weeks

## 2020-02-24 NOTE — HISTORY OF PRESENT ILLNESS
[FreeTextEntry1] : 65 yo F with PMH of Hypothyroidism and lumbar and cervical radiculopathy RHD with prior trigger finger release of left 4th digit with Dr. Covington in 4/2017 who presents with c/o right middle and 4th digit pain and triggering for approximately 3 weeks associated with stiffness. Patient reports prior steroid injection to right middle finger with improvement. Patient denies any hand trauma. \par \par Interval history (7/26/18): Here for 6 week follow up after kenalog injection to right 3rd and 4th digit.  She reports improvement of her trigger fingers; denies locking/clicking.  She endorses that she has prior kenalog injection to right 3rd digit for a total of 2 injections.  She has new complaint of left 3rd digit triggering and associated tenderness.  Denies fevers, chills, redness. \par \par Interval history (9/6/18): here for interval followup after trigger finger kenalog injection of left 3rd digit.  She reports improvement of triggering symptoms since the steroid injection, but has noted return of increased stiffness and mild triggering.  She denies locking up.  Here to discuss possible steroid treatment of left 3rd digit trigger digit.  Also inquiring whether she might have Dupretryn's disease of the left hand\par \par Interval hx (3/22/19): Here for follow up of c/o right 3rd and 4th digit triggering and associating pain.  also persistent left 3rd digit triggering and discomfort.  Evaluated by Rheum, negative for RA on blood work. Had hand x-rays performed which demonstrated degenerative changes.\par \par Interval hx (8/12/19): Here for follow up and discussion of surgical treatment options.  Still c/o right 3rd & 4th digit triggering and associated pain.  New complaint of right 2nd digit pain and occasional triggering.  No new changes in health. No ASA.\par \par Interval hx (9/11/19): Pt presents today POD #13 s/p right 3rd and 4th TFR c/o improving incisional discomfort. Compliant with hand rest and elevation. Denies f/c.\par \par Interval hx (10/3/19). Patient presents today 5 weeks post-op c/o persistent right hand swelling despite elevation. Compliant with hand rest, and OT with minimal improvement. \par \par Interval hx (10/18/19): Here for 7 week post-op visit s/p right 3rd and 4th TFR.  She reports moderate  improvement of right hand swelling after Sodumedrol pack.  Denies acute hand pain, fevers, chills, redness.  Compliant with OHT who reports significant progress compared to history of left hand with similar post-op healing.\par \par Interval hx (11/8/19):  Here for follow up. reports slow but progressive improvement of right hand swelling.  Increasing ROM. Compliant with silicone sheeting to incisions.\par \par Interval hx (12/9/19):  Here for follow up. Reports improvement of right hand swelling and ROM with hand compression sleeve and ongoing OHT.  She reports needing OHT rx renewal.  compliant with silicone sheeting to incisions and scar massage.  Feels right wrist is weak with lifitng and wrist flexion.  Also new c/o left middle finger triggering, occasional.  Inquiring about kenalog injection.\par \par Interval hx (1/6/20: Pt presents for f/u- 4.5 months s/p R 3rd and 4th TFR. Still going to OT with improving strength and decreasing scar tenderness and c/o mild paresthesias to right hand x2 weeks. No EMG testing done.  No prior history of similar paresthesia complaints.  No other complaints.\par \par Interval hx (2/24/20):  Here for 6 week follow.  Going to OT with slow but notable improvement of ROM and scar tenderness; now down to 2x/week.  Obatiend EMG/NCT, here to review results.  Also reports that pain mgmt ordered a neck MRI.  pt has new complaint of RUE (shoulder to hand) numbness went laying right lateral recumbent and improves after changing positions.  Also complaint of right hand pain and numbness at night time when left lateral recumbent.  No new helath changes

## 2020-02-24 NOTE — PHYSICAL EXAM
[de-identified] : well-appearing, very anxious  [de-identified] : right hand: well-healed incisions over 3rd and 4th A1 pulley, scars softer and flat, no significant swelling, continuing improvement of ROM, positive Phalens, negative Tinels and grind test, 5/5 strength APB, 4/5  strength, SILT r/u/m\par left hand: no triggering of 3rd or 4th digit; palpable small nodule with no triggering of 3rd digit, 5/5  strength, negative Phalens/Tinels/grind, SILT r/u/m

## 2020-02-28 ENCOUNTER — OUTPATIENT (OUTPATIENT)
Dept: OUTPATIENT SERVICES | Facility: HOSPITAL | Age: 65
LOS: 1 days | Discharge: HOME | End: 2020-02-28

## 2020-02-28 DIAGNOSIS — M65.341 TRIGGER FINGER, RIGHT RING FINGER: ICD-10-CM

## 2020-02-28 DIAGNOSIS — M65.331 TRIGGER FINGER, RIGHT MIDDLE FINGER: ICD-10-CM

## 2020-02-28 DIAGNOSIS — Z90.710 ACQUIRED ABSENCE OF BOTH CERVIX AND UTERUS: Chronic | ICD-10-CM

## 2020-06-09 ENCOUNTER — APPOINTMENT (OUTPATIENT)
Dept: PLASTIC SURGERY | Facility: CLINIC | Age: 65
End: 2020-06-09
Payer: COMMERCIAL

## 2020-06-09 PROCEDURE — 99213 OFFICE O/P EST LOW 20 MIN: CPT

## 2020-06-09 NOTE — PHYSICAL EXAM
[de-identified] : well-appearing, NAD [de-identified] : Right hand: triggering of right thumb with tenderness to palpation over A1 pulley, well-healed incisions over 3rd and 4th A1 pulley, scars soft and flat, no significant swelling, significantly improvement of ROM, positive Phalens, negative Tinels and grind test, 5/5 strength APB, 4/5  strength, SILT r/u/m\par Left hand: no triggering of 3rd or 4th digit; palpable small nodule with no triggering of 3rd digit, 5/5  strength, negative Phalens/Tinels/grind, SILT r/u/m

## 2020-06-09 NOTE — HISTORY OF PRESENT ILLNESS
[FreeTextEntry1] : 63 yo F with PMH of Hypothyroidism and lumbar and cervical radiculopathy RHD with prior trigger finger release of left 4th digit with Dr. Covington in 4/2017 who presents with c/o right middle and 4th digit pain and triggering for approximately 3 weeks associated with stiffness. Patient reports prior steroid injection to right middle finger with improvement. Patient denies any hand trauma. \par \par Interval history (7/26/18): Here for 6 week follow up after kenalog injection to right 3rd and 4th digit.  She reports improvement of her trigger fingers; denies locking/clicking.  She endorses that she has prior kenalog injection to right 3rd digit for a total of 2 injections.  She has new complaint of left 3rd digit triggering and associated tenderness.  Denies fevers, chills, redness. \par \par Interval history (9/6/18): here for interval followup after trigger finger kenalog injection of left 3rd digit.  She reports improvement of triggering symptoms since the steroid injection, but has noted return of increased stiffness and mild triggering.  She denies locking up.  Here to discuss possible steroid treatment of left 3rd digit trigger digit.  Also inquiring whether she might have Dupretryn's disease of the left hand\par \par Interval hx (3/22/19): Here for follow up of c/o right 3rd and 4th digit triggering and associating pain.  also persistent left 3rd digit triggering and discomfort.  Evaluated by Rheum, negative for RA on blood work. Had hand x-rays performed which demonstrated degenerative changes.\par \par Interval hx (8/12/19): Here for follow up and discussion of surgical treatment options.  Still c/o right 3rd & 4th digit triggering and associated pain.  New complaint of right 2nd digit pain and occasional triggering.  No new changes in health. No ASA.\par \par Interval hx (9/11/19): Pt presents today POD #13 s/p right 3rd and 4th TFR c/o improving incisional discomfort. Compliant with hand rest and elevation. Denies f/c.\par \par Interval hx (10/3/19). Patient presents today 5 weeks post-op c/o persistent right hand swelling despite elevation. Compliant with hand rest, and OT with minimal improvement. \par \par Interval hx (10/18/19): Here for 7 week post-op visit s/p right 3rd and 4th TFR.  She reports moderate  improvement of right hand swelling after Sodumedrol pack.  Denies acute hand pain, fevers, chills, redness.  Compliant with OHT who reports significant progress compared to history of left hand with similar post-op healing.\par \par Interval hx (11/8/19):  Here for follow up. reports slow but progressive improvement of right hand swelling.  Increasing ROM. Compliant with silicone sheeting to incisions.\par \par Interval hx (12/9/19):  Here for follow up. Reports improvement of right hand swelling and ROM with hand compression sleeve and ongoing OHT.  She reports needing OHT rx renewal.  compliant with silicone sheeting to incisions and scar massage.  Feels right wrist is weak with lifitng and wrist flexion.  Also new c/o left middle finger triggering, occasional.  Inquiring about kenalog injection.\par \par Interval hx (1/6/20: Pt presents for f/u- 4.5 months s/p R 3rd and 4th TFR. Still going to OT with improving strength and decreasing scar tenderness and c/o mild paresthesias to right hand x2 weeks. No EMG testing done.  No prior history of similar paresthesia complaints.  No other complaints.\par \par Interval hx (2/24/20):  Here for 6 week follow.  Going to OT with slow but notable improvement of ROM and scar tenderness; now down to 2x/week.  Obtained EMG/NCT, here to review results.  Also reports that pain mgmt ordered a neck MRI.  pt has new complaint of RUE (shoulder to hand) numbness went laying right lateral recumbent and improves after changing positions.  Also complaint of right hand pain and numbness at night time when left lateral recumbent.  No new health changes.\par \par Interval hx (6/9/20). Patient presents today for f/u. Reports improvement in right wrist pain and finger numbness since Kenalog injection into carpal tunnel last visit. Completed OT with significant improvement in ROM. Wearing night splint. Patient c/o new pain and locking of right thumb for the past 2-3 weeks. Denies any hand trauma or prior injections into right thumb.

## 2020-06-09 NOTE — PROCEDURE
[FreeTextEntry1] : Right trigger thumb [FreeTextEntry2] : Kenalog injection right thumb  [FreeTextEntry6] : The benefits, risks, and outcomes of Kenalog injection were discussed. The risks include infection, hypopigmentation, poor wound healing, fat atrophy, and dissatisfaction with outcome. The patient understood the risks and elected to proceed with steroid injection.\par \par Informed consent obtained\par \par The right thumb was prepared in sterile fashion. \par The injection site was identified and marked. \par Triamcinolone 5 mg was injected into the A1 pulley. \par The patient tolerated the procedure.

## 2020-06-09 NOTE — ASSESSMENT
[FreeTextEntry1] : 65 yo F with h/o left 4th trigger s/p trigger release\par Possible early Dupuytren of left 3rd/4th palmar ray, no flexion deformity; no triggering today\par right index trigger finger, s/p Kenalog injection x1, asymptomatic\par Persistent right 3rd and 4th trigger fingers s/p 2nd Kenalog injection, s/p R 3rd and 4th TFR, doing well.\par \par Possible right neck radiculopathy- workup delayed by COVID.\par Right CTS (mild) s/p right wrist Kenalog injection 2/20, improved. \par New right trigger thumb. \par \par - Kenalog injection to right thumb today (see procedure note)\par - c/w right hand scar massage\par - c/w right hand nighttime carpal tunnel splint x 6 weeks\par - awaiting neck MRI as ordered by pain mgmt\par - all questions were answered\par - f/u 6 weeks

## 2020-11-09 ENCOUNTER — APPOINTMENT (OUTPATIENT)
Dept: PLASTIC SURGERY | Facility: CLINIC | Age: 65
End: 2020-11-09
Payer: COMMERCIAL

## 2020-12-07 ENCOUNTER — APPOINTMENT (OUTPATIENT)
Dept: PLASTIC SURGERY | Facility: CLINIC | Age: 65
End: 2020-12-07
Payer: COMMERCIAL

## 2020-12-07 PROCEDURE — 99072 ADDL SUPL MATRL&STAF TM PHE: CPT

## 2020-12-07 PROCEDURE — 99213 OFFICE O/P EST LOW 20 MIN: CPT

## 2020-12-07 NOTE — PHYSICAL EXAM
[de-identified] : well-appearing, NAD [de-identified] : Right hand: no triggering of right thumb with no nodule on palpation over A1 pulley, well-healed incisions over 3rd and 4th A1 pulley, scars soft and flat, no significant swelling, significantly improvement of ROM, negative Phalens, negative Tinels and grind test, 5/5 strength APB, 4/5  strength, SILT r/u/m\par Left hand: no triggering of 3rd or 4th digit; palpable small nodule with no triggering of 3rd digit, 5/5  strength, negative Phalens/Tinels/grind, SILT r/u/m

## 2020-12-07 NOTE — HISTORY OF PRESENT ILLNESS
[FreeTextEntry1] : 63 yo F with PMH of Hypothyroidism and lumbar and cervical radiculopathy RHD with prior trigger finger release of left 4th digit with Dr. Covington in 4/2017 who presents with c/o right middle and 4th digit pain and triggering for approximately 3 weeks associated with stiffness. Patient reports prior steroid injection to right middle finger with improvement. Patient denies any hand trauma. \par \par Interval history (7/26/18): Here for 6 week follow up after kenalog injection to right 3rd and 4th digit.  She reports improvement of her trigger fingers; denies locking/clicking.  She endorses that she has prior kenalog injection to right 3rd digit for a total of 2 injections.  She has new complaint of left 3rd digit triggering and associated tenderness.  Denies fevers, chills, redness. \par \par Interval history (9/6/18): here for interval followup after trigger finger kenalog injection of left 3rd digit.  She reports improvement of triggering symptoms since the steroid injection, but has noted return of increased stiffness and mild triggering.  She denies locking up.  Here to discuss possible steroid treatment of left 3rd digit trigger digit.  Also inquiring whether she might have Dupretryn's disease of the left hand\par \par Interval hx (3/22/19): Here for follow up of c/o right 3rd and 4th digit triggering and associating pain.  also persistent left 3rd digit triggering and discomfort.  Evaluated by Rheum, negative for RA on blood work. Had hand x-rays performed which demonstrated degenerative changes.\par \par Interval hx (8/12/19): Here for follow up and discussion of surgical treatment options.  Still c/o right 3rd & 4th digit triggering and associated pain.  New complaint of right 2nd digit pain and occasional triggering.  No new changes in health. No ASA.\par \par Interval hx (9/11/19): Pt presents today POD #13 s/p right 3rd and 4th TFR c/o improving incisional discomfort. Compliant with hand rest and elevation. Denies f/c.\par \par Interval hx (10/3/19). Patient presents today 5 weeks post-op c/o persistent right hand swelling despite elevation. Compliant with hand rest, and OT with minimal improvement. \par \par Interval hx (10/18/19): Here for 7 week post-op visit s/p right 3rd and 4th TFR.  She reports moderate  improvement of right hand swelling after Sodumedrol pack.  Denies acute hand pain, fevers, chills, redness.  Compliant with OHT who reports significant progress compared to history of left hand with similar post-op healing.\par \par Interval hx (11/8/19):  Here for follow up. reports slow but progressive improvement of right hand swelling.  Increasing ROM. Compliant with silicone sheeting to incisions.\par \par Interval hx (12/9/19):  Here for follow up. Reports improvement of right hand swelling and ROM with hand compression sleeve and ongoing OHT.  She reports needing OHT rx renewal.  compliant with silicone sheeting to incisions and scar massage.  Feels right wrist is weak with lifitng and wrist flexion.  Also new c/o left middle finger triggering, occasional.  Inquiring about kenalog injection.\par \par Interval hx (1/6/20: Pt presents for f/u- 4.5 months s/p R 3rd and 4th TFR. Still going to OT with improving strength and decreasing scar tenderness and c/o mild paresthesias to right hand x2 weeks. No EMG testing done.  No prior history of similar paresthesia complaints.  No other complaints.\par \par Interval hx (2/24/20):  Here for 6 week follow.  Going to OT with slow but notable improvement of ROM and scar tenderness; now down to 2x/week.  Obtained EMG/NCT, here to review results.  Also reports that pain mgmt ordered a neck MRI.  pt has new complaint of RUE (shoulder to hand) numbness went laying right lateral recumbent and improves after changing positions.  Also complaint of right hand pain and numbness at night time when left lateral recumbent.  No new health changes.\par \par Interval hx (6/9/20). Patient presents today for f/u. Reports improvement in right wrist pain and finger numbness since Kenalog injection into carpal tunnel last visit. Completed OT with significant improvement in ROM. Wearing night splint. Patient c/o new pain and locking of right thumb for the past 2-3 weeks. Denies any hand trauma or prior injections into right thumb. \par \par interval hx (12/7/20):  here for right trigger thumb follow up.  Reports no triggering, so she postponed her follow up due to COVID.  Also did not obtain MRI of neck per pain mgmt due to COVID.  Report overall improvement of hand swelling and pain.

## 2021-03-08 ENCOUNTER — APPOINTMENT (OUTPATIENT)
Dept: PLASTIC SURGERY | Facility: CLINIC | Age: 66
End: 2021-03-08

## 2021-06-11 ENCOUNTER — APPOINTMENT (OUTPATIENT)
Dept: PLASTIC SURGERY | Facility: CLINIC | Age: 66
End: 2021-06-11
Payer: COMMERCIAL

## 2021-06-11 DIAGNOSIS — M65.319 TRIGGER THUMB, UNSPECIFIED THUMB: ICD-10-CM

## 2021-06-11 PROCEDURE — 99072 ADDL SUPL MATRL&STAF TM PHE: CPT

## 2021-06-11 PROCEDURE — 99212 OFFICE O/P EST SF 10 MIN: CPT | Mod: 25

## 2021-06-11 PROCEDURE — 20550 NJX 1 TENDON SHEATH/LIGAMENT: CPT

## 2021-06-11 NOTE — PHYSICAL EXAM
[de-identified] : well-appearing, NAD [de-identified] : Right hand: +triggering of right thumb with tender nodule on palpation over A1 pulley, well-healed incisions over 3rd and 4th A1 pulley, scars soft and flat, no significant swelling, significantly improvement of ROM, negative Phalens, negative Tinels and grind test, 5/5 strength APB, 4/5  strength, SILT r/u/m\par Left hand: no triggering of 3rd or 4th digit; palpable small nodule with no triggering of 3rd digit, 5/5  strength, negative Phalens/Tinels/grind, SILT r/u/m

## 2021-06-11 NOTE — PROCEDURE
[FreeTextEntry1] : right trigger thumb [FreeTextEntry2] : right trigger thumb kenalog injection [FreeTextEntry6] : The benefits, risks, and outcomes of kenalog injection were discussed. The risks include infection, hypopigmentation, poor wound healing, fat atrophy, hyperglycemia and dissatisfaction with outcome. The patient understood the risks and elected to proceed with steroid injection.\par \par The right thumb was prepared in sterile fashion. \par The injection site was identified and marked. \par Triamcinolone 5 mg was injected into the flexor tendon sheath without issue. \par The patient tolerated the procedure.\par

## 2021-06-11 NOTE — ASSESSMENT
[FreeTextEntry1] : 65 yo F with h/o left 4th trigger s/p trigger release\par Possible early Dupuytren of left 3rd/4th palmar ray, no flexion deformity; no triggering today\par right index trigger finger, s/p Kenalog injection x1, asymptomatic\par Persistent right 3rd and 4th trigger fingers s/p 2nd Kenalog injection, s/p R 3rd and 4th TFR, doing well.\par \par Possible right neck radiculopathy- workup delayed by COVID.\par Right CTS (mild) s/p right wrist Kenalog injection 2/20, improved.\par New right trigger thumb. Recurrent right trigger thumb s/p kenalog injection 6/11/21\par \par - MRI neck to be forwarded to office review\par - recommend kenalog injection .  B/RA reviewed\par - all questions were answered\par - f/u 6 weeks for thumb re-eval\par \par Due to COVID-19, pre-visit patient instructions were explained to the patient and their symptoms were checked upon arrival. Masks were used by the healthcare provider and staff and the examination room was cleaned after the patient visit concluded\par

## 2021-06-11 NOTE — HISTORY OF PRESENT ILLNESS
[FreeTextEntry1] : 63 yo F with PMH of Hypothyroidism and lumbar and cervical radiculopathy RHD with prior trigger finger release of left 4th digit with Dr. Covington in 4/2017 who presents with c/o right middle and 4th digit pain and triggering for approximately 3 weeks associated with stiffness. Patient reports prior steroid injection to right middle finger with improvement. Patient denies any hand trauma. \par \par Interval history (7/26/18): Here for 6 week follow up after kenalog injection to right 3rd and 4th digit.  She reports improvement of her trigger fingers; denies locking/clicking.  She endorses that she has prior kenalog injection to right 3rd digit for a total of 2 injections.  She has new complaint of left 3rd digit triggering and associated tenderness.  Denies fevers, chills, redness. \par \par Interval history (9/6/18): here for interval followup after trigger finger kenalog injection of left 3rd digit.  She reports improvement of triggering symptoms since the steroid injection, but has noted return of increased stiffness and mild triggering.  She denies locking up.  Here to discuss possible steroid treatment of left 3rd digit trigger digit.  Also inquiring whether she might have Dupretryn's disease of the left hand\par \par Interval hx (3/22/19): Here for follow up of c/o right 3rd and 4th digit triggering and associating pain.  also persistent left 3rd digit triggering and discomfort.  Evaluated by Rheum, negative for RA on blood work. Had hand x-rays performed which demonstrated degenerative changes.\par \par Interval hx (8/12/19): Here for follow up and discussion of surgical treatment options.  Still c/o right 3rd & 4th digit triggering and associated pain.  New complaint of right 2nd digit pain and occasional triggering.  No new changes in health. No ASA.\par \par Interval hx (9/11/19): Pt presents today POD #13 s/p right 3rd and 4th TFR c/o improving incisional discomfort. Compliant with hand rest and elevation. Denies f/c.\par \par Interval hx (10/3/19). Patient presents today 5 weeks post-op c/o persistent right hand swelling despite elevation. Compliant with hand rest, and OT with minimal improvement. \par \par Interval hx (10/18/19): Here for 7 week post-op visit s/p right 3rd and 4th TFR.  She reports moderate  improvement of right hand swelling after Sodumedrol pack.  Denies acute hand pain, fevers, chills, redness.  Compliant with OHT who reports significant progress compared to history of left hand with similar post-op healing.\par \par Interval hx (11/8/19):  Here for follow up. reports slow but progressive improvement of right hand swelling.  Increasing ROM. Compliant with silicone sheeting to incisions.\par \par Interval hx (12/9/19):  Here for follow up. Reports improvement of right hand swelling and ROM with hand compression sleeve and ongoing OHT.  She reports needing OHT rx renewal.  compliant with silicone sheeting to incisions and scar massage.  Feels right wrist is weak with lifitng and wrist flexion.  Also new c/o left middle finger triggering, occasional.  Inquiring about kenalog injection.\par \par Interval hx (1/6/20: Pt presents for f/u- 4.5 months s/p R 3rd and 4th TFR. Still going to OT with improving strength and decreasing scar tenderness and c/o mild paresthesias to right hand x2 weeks. No EMG testing done.  No prior history of similar paresthesia complaints.  No other complaints.\par \par Interval hx (2/24/20):  Here for 6 week follow.  Going to OT with slow but notable improvement of ROM and scar tenderness; now down to 2x/week.  Obtained EMG/NCT, here to review results.  Also reports that pain mgmt ordered a neck MRI.  pt has new complaint of RUE (shoulder to hand) numbness went laying right lateral recumbent and improves after changing positions.  Also complaint of right hand pain and numbness at night time when left lateral recumbent.  No new health changes.\par \par Interval hx (6/9/20). Patient presents today for f/u. Reports improvement in right wrist pain and finger numbness since Kenalog injection into carpal tunnel last visit. Completed OT with significant improvement in ROM. Wearing night splint. Patient c/o new pain and locking of right thumb for the past 2-3 weeks. Denies any hand trauma or prior injections into right thumb. \par \par interval hx (12/7/20):  here for right trigger thumb follow up.  Reports no triggering, so she postponed her follow up due to COVID.  Also did not obtain MRI of neck per pain mgmt due to COVID.  Report overall improvement of hand swelling and pain. \par \par Interval hx (6/11/21):  c/o occasional paresthesias in AM BL hands.   She feels paresthesias have improved overall.  Had MRI c-spine performed and pain mgmt did not recommend acute surgical intervention.  Here for recurrent right thumb triggering x 2 weeks.  here to discuss trigger themb
none

## 2021-07-12 ENCOUNTER — TRANSCRIPTION ENCOUNTER (OUTPATIENT)
Age: 66
End: 2021-07-12

## 2021-09-20 ENCOUNTER — APPOINTMENT (OUTPATIENT)
Dept: PLASTIC SURGERY | Facility: CLINIC | Age: 66
End: 2021-09-20

## 2021-09-28 ENCOUNTER — APPOINTMENT (OUTPATIENT)
Dept: ENDOCRINOLOGY | Facility: CLINIC | Age: 66
End: 2021-09-28

## 2021-10-25 ENCOUNTER — APPOINTMENT (OUTPATIENT)
Dept: ORTHOPEDIC SURGERY | Facility: CLINIC | Age: 66
End: 2021-10-25
Payer: COMMERCIAL

## 2021-10-25 VITALS — RESPIRATION RATE: 16 BRPM | BODY MASS INDEX: 32.39 KG/M2 | WEIGHT: 165 LBS | HEIGHT: 60 IN

## 2021-10-25 DIAGNOSIS — S69.92XA UNSPECIFIED INJURY OF LEFT WRIST, HAND AND FINGER(S), INITIAL ENCOUNTER: ICD-10-CM

## 2021-10-25 PROCEDURE — 99204 OFFICE O/P NEW MOD 45 MIN: CPT

## 2021-10-25 PROCEDURE — 73110 X-RAY EXAM OF WRIST: CPT | Mod: 50

## 2021-10-25 PROCEDURE — 73140 X-RAY EXAM OF FINGER(S): CPT | Mod: F5,FA

## 2021-10-25 PROCEDURE — 73070 X-RAY EXAM OF ELBOW: CPT | Mod: LT

## 2021-10-26 ENCOUNTER — APPOINTMENT (OUTPATIENT)
Dept: CARDIOLOGY | Facility: CLINIC | Age: 66
End: 2021-10-26
Payer: COMMERCIAL

## 2021-10-26 ENCOUNTER — OUTPATIENT (OUTPATIENT)
Dept: OUTPATIENT SERVICES | Facility: HOSPITAL | Age: 66
LOS: 1 days | Discharge: HOME | End: 2021-10-26

## 2021-10-26 VITALS
SYSTOLIC BLOOD PRESSURE: 120 MMHG | WEIGHT: 164 LBS | HEIGHT: 60 IN | DIASTOLIC BLOOD PRESSURE: 80 MMHG | BODY MASS INDEX: 32.2 KG/M2

## 2021-10-26 DIAGNOSIS — Z11.59 ENCOUNTER FOR SCREENING FOR OTHER VIRAL DISEASES: ICD-10-CM

## 2021-10-26 DIAGNOSIS — Z90.710 ACQUIRED ABSENCE OF BOTH CERVIX AND UTERUS: Chronic | ICD-10-CM

## 2021-10-26 DIAGNOSIS — Z01.810 ENCOUNTER FOR PREPROCEDURAL CARDIOVASCULAR EXAMINATION: ICD-10-CM

## 2021-10-26 PROCEDURE — 93000 ELECTROCARDIOGRAM COMPLETE: CPT

## 2021-10-26 PROCEDURE — 99214 OFFICE O/P EST MOD 30 MIN: CPT

## 2021-10-26 RX ORDER — ESOMEPRAZOLE MAGNESIUM 40 MG/1
40 CAPSULE, DELAYED RELEASE ORAL
Qty: 30 | Refills: 0 | Status: DISCONTINUED | COMMUNITY
Start: 2016-12-08 | End: 2021-10-26

## 2021-10-26 RX ORDER — DICLOFENAC POTASSIUM 50 MG/1
50 TABLET, COATED ORAL
Qty: 60 | Refills: 0 | Status: DISCONTINUED | COMMUNITY
Start: 2018-02-27 | End: 2021-10-26

## 2021-10-26 RX ORDER — METHYLPREDNISOLONE 4 MG/1
4 TABLET ORAL
Qty: 1 | Refills: 0 | Status: DISCONTINUED | COMMUNITY
Start: 2019-10-03 | End: 2021-10-26

## 2021-10-26 RX ORDER — OXYCODONE 20 MG/1
20 TABLET ORAL
Qty: 120 | Refills: 0 | Status: DISCONTINUED | COMMUNITY
Start: 2018-01-02 | End: 2021-10-26

## 2021-10-26 RX ORDER — ZOLPIDEM TARTRATE 10 MG/1
10 TABLET ORAL
Refills: 0 | Status: ACTIVE | COMMUNITY
Start: 2016-10-10

## 2021-10-26 RX ORDER — DESLORATADINE 5 MG/1
5 TABLET ORAL
Qty: 30 | Refills: 0 | Status: DISCONTINUED | COMMUNITY
Start: 2017-02-18 | End: 2021-10-26

## 2021-10-26 RX ORDER — OSELTAMIVIR PHOSPHATE 75 MG/1
75 CAPSULE ORAL
Qty: 10 | Refills: 0 | Status: DISCONTINUED | COMMUNITY
Start: 2018-03-13 | End: 2021-10-26

## 2021-10-26 NOTE — ASSESSMENT
[FreeTextEntry1] : Abnormal ECG, unchanged since 2019 (compared with prior ECGs).\par Normal cardiac evaluation following the finding of similar ECG in 2019.\par Exercise capacity 10 METs.\par Low risk for cardiovascular complications of ORIF of left radius fracture.\par \par Recommend:\par Proceed with surgery.\par No SBE prophylaxis, no beta blockers.\par F/u in 4 months after fasting blood work.\par \par Roger Zapata MD\par

## 2021-10-26 NOTE — CARDIOLOGY SUMMARY
[de-identified] : 10/26/21:\par SR, diffuse NSST changes. [de-identified] : Stress ECHO 12/06/19:\par 10 METs, no ischemia\par LVEF 60%, G1DD.

## 2021-10-26 NOTE — HISTORY OF PRESENT ILLNESS
[FreeTextEntry1] : 66-yo female with h/o hyperlipidemia and hypothyroidism. Patient fell at home and fractured left wrist (displaced left radius fracture). She is scheduled for ORIF. Patient is referred for evaluation for abnormal ECG.\par \par Similar situation 2 years ago. Abnormal ECG also discovered during preoperative evaluation. Normal stress echo at the time, surgery uncomplicated.\par \par Patient denies CP, SOB, palpitations.

## 2021-10-27 ENCOUNTER — TRANSCRIPTION ENCOUNTER (OUTPATIENT)
Age: 66
End: 2021-10-27

## 2021-10-27 ENCOUNTER — RESULT CHARGE (OUTPATIENT)
Age: 66
End: 2021-10-27

## 2021-10-28 ENCOUNTER — OUTPATIENT (OUTPATIENT)
Dept: OUTPATIENT SERVICES | Facility: HOSPITAL | Age: 66
LOS: 1 days | Discharge: HOME | End: 2021-10-28

## 2021-10-28 ENCOUNTER — APPOINTMENT (OUTPATIENT)
Dept: ORTHOPEDIC SURGERY | Facility: AMBULATORY SURGERY CENTER | Age: 66
End: 2021-10-28
Payer: COMMERCIAL

## 2021-10-28 DIAGNOSIS — Z90.710 ACQUIRED ABSENCE OF BOTH CERVIX AND UTERUS: Chronic | ICD-10-CM

## 2021-10-28 PROCEDURE — 64721 CARPAL TUNNEL SURGERY: CPT | Mod: LT

## 2021-10-28 PROCEDURE — 25608 OPTX DST RD XART FX/EPI SEP2: CPT

## 2021-11-08 ENCOUNTER — APPOINTMENT (OUTPATIENT)
Dept: ORTHOPEDIC SURGERY | Facility: CLINIC | Age: 66
End: 2021-11-08
Payer: COMMERCIAL

## 2021-11-08 PROCEDURE — 73110 X-RAY EXAM OF WRIST: CPT | Mod: LT

## 2021-11-08 PROCEDURE — 99024 POSTOP FOLLOW-UP VISIT: CPT

## 2021-11-29 ENCOUNTER — APPOINTMENT (OUTPATIENT)
Dept: ORTHOPEDIC SURGERY | Facility: CLINIC | Age: 66
End: 2021-11-29
Payer: COMMERCIAL

## 2021-11-29 PROCEDURE — 73110 X-RAY EXAM OF WRIST: CPT | Mod: LT

## 2021-11-29 PROCEDURE — 99024 POSTOP FOLLOW-UP VISIT: CPT

## 2022-01-10 ENCOUNTER — APPOINTMENT (OUTPATIENT)
Dept: ORTHOPEDIC SURGERY | Facility: CLINIC | Age: 67
End: 2022-01-10
Payer: COMMERCIAL

## 2022-01-10 PROCEDURE — 99024 POSTOP FOLLOW-UP VISIT: CPT

## 2022-01-10 PROCEDURE — 73110 X-RAY EXAM OF WRIST: CPT | Mod: LT

## 2022-03-03 ENCOUNTER — RESULT CHARGE (OUTPATIENT)
Age: 67
End: 2022-03-03

## 2022-03-03 ENCOUNTER — APPOINTMENT (OUTPATIENT)
Dept: CARDIOLOGY | Facility: CLINIC | Age: 67
End: 2022-03-03
Payer: COMMERCIAL

## 2022-03-03 VITALS
DIASTOLIC BLOOD PRESSURE: 70 MMHG | HEART RATE: 91 BPM | WEIGHT: 160 LBS | BODY MASS INDEX: 31.41 KG/M2 | HEIGHT: 60 IN | SYSTOLIC BLOOD PRESSURE: 115 MMHG

## 2022-03-03 PROCEDURE — 99214 OFFICE O/P EST MOD 30 MIN: CPT

## 2022-03-03 PROCEDURE — 93000 ELECTROCARDIOGRAM COMPLETE: CPT

## 2022-03-03 RX ORDER — OXYCODONE HYDROCHLORIDE 20 MG/1
20 TABLET, FILM COATED, EXTENDED RELEASE ORAL
Qty: 60 | Refills: 0 | Status: DISCONTINUED | COMMUNITY
Start: 2018-01-02 | End: 2022-03-03

## 2022-03-03 RX ORDER — METHYLPREDNISOLONE 4 MG/1
4 TABLET ORAL
Qty: 1 | Refills: 0 | Status: DISCONTINUED | COMMUNITY
Start: 2022-01-26 | End: 2022-03-03

## 2022-03-03 NOTE — ASSESSMENT
[FreeTextEntry1] : Abnormal ECG.\par Increased TOVAR.\par \par Recommend:\par Exercise stress echocardiogram.\par \par Roger Zapata MD\par

## 2022-03-03 NOTE — HISTORY OF PRESENT ILLNESS
[FreeTextEntry1] : 66-yo female with h/o hyperlipidemia and hypothyroidism. Abnormal ECG for several years, stress ECHO normal initially.\par \par Patient denies CP now but c/o increased TOVAR in the last few months. No cough or leg edema.\par \par \par GFR 76\par TSH 0.83.

## 2022-03-03 NOTE — CARDIOLOGY SUMMARY
[de-identified] : 03/03/22\par SR, diffuse NSST changes. [de-identified] : Stress ECHO 12/06/19:\par 10 METs, no ischemia\par LVEF 60%, G1DD.

## 2022-03-03 NOTE — REVIEW OF SYSTEMS
[Negative] : Neurological [Dyspnea on exertion] : dyspnea during exertion [Chest Discomfort] : no chest discomfort [Lower Ext Edema] : no extremity edema [Leg Claudication] : no intermittent leg claudication [Palpitations] : no palpitations [Orthopnea] : no orthopnea [Syncope] : no syncope [Anxiety] : no anxiety

## 2022-03-14 ENCOUNTER — APPOINTMENT (OUTPATIENT)
Dept: ORTHOPEDIC SURGERY | Facility: CLINIC | Age: 67
End: 2022-03-14
Payer: COMMERCIAL

## 2022-03-14 VITALS — BODY MASS INDEX: 31.41 KG/M2 | RESPIRATION RATE: 16 BRPM | HEIGHT: 60 IN | WEIGHT: 160 LBS

## 2022-03-14 DIAGNOSIS — S52.592D OTHER FRACTURES OF LOWER END OF LEFT RADIUS, SUBSEQUENT ENCOUNTER FOR CLOSED FRACTURE WITH ROUTINE HEALING: ICD-10-CM

## 2022-03-14 DIAGNOSIS — M65.311 TRIGGER THUMB, RIGHT THUMB: ICD-10-CM

## 2022-03-14 PROCEDURE — 73110 X-RAY EXAM OF WRIST: CPT | Mod: LT

## 2022-03-14 PROCEDURE — 73140 X-RAY EXAM OF FINGER(S): CPT | Mod: F5

## 2022-03-14 PROCEDURE — 99214 OFFICE O/P EST MOD 30 MIN: CPT | Mod: 25

## 2022-03-14 PROCEDURE — 20550 NJX 1 TENDON SHEATH/LIGAMENT: CPT

## 2022-03-24 ENCOUNTER — OUTPATIENT (OUTPATIENT)
Dept: OUTPATIENT SERVICES | Facility: HOSPITAL | Age: 67
LOS: 1 days | Discharge: HOME | End: 2022-03-24

## 2022-03-24 DIAGNOSIS — Z90.710 ACQUIRED ABSENCE OF BOTH CERVIX AND UTERUS: Chronic | ICD-10-CM

## 2022-03-24 DIAGNOSIS — S52.592D OTHER FRACTURES OF LOWER END OF LEFT RADIUS, SUBSEQUENT ENCOUNTER FOR CLOSED FRACTURE WITH ROUTINE HEALING: ICD-10-CM

## 2022-04-05 ENCOUNTER — APPOINTMENT (OUTPATIENT)
Dept: RHEUMATOLOGY | Facility: CLINIC | Age: 67
End: 2022-04-05
Payer: COMMERCIAL

## 2022-04-05 VITALS
DIASTOLIC BLOOD PRESSURE: 80 MMHG | WEIGHT: 163 LBS | TEMPERATURE: 97.9 F | HEART RATE: 95 BPM | BODY MASS INDEX: 32 KG/M2 | OXYGEN SATURATION: 98 % | HEIGHT: 60 IN | SYSTOLIC BLOOD PRESSURE: 120 MMHG

## 2022-04-05 DIAGNOSIS — M25.50 PAIN IN UNSPECIFIED JOINT: ICD-10-CM

## 2022-04-05 PROCEDURE — 99204 OFFICE O/P NEW MOD 45 MIN: CPT

## 2022-04-05 RX ORDER — OXYCODONE AND ACETAMINOPHEN 5; 325 MG/1; MG/1
5-325 TABLET ORAL
Qty: 10 | Refills: 0 | Status: DISCONTINUED | COMMUNITY
Start: 2019-08-28 | End: 2022-04-05

## 2022-04-05 RX ORDER — GABAPENTIN 300 MG/1
300 CAPSULE ORAL TWICE DAILY
Refills: 0 | Status: DISCONTINUED | COMMUNITY
Start: 2018-02-27 | End: 2022-04-05

## 2022-04-05 RX ORDER — GLUCOSAMINE/MSM/CHONDROIT SULF 500-166.6
1000 TABLET ORAL
Qty: 30 | Refills: 0 | Status: DISCONTINUED | COMMUNITY
Start: 2017-04-24 | End: 2022-04-05

## 2022-04-05 RX ORDER — OXYCODONE 20 MG/1
20 TABLET ORAL
Refills: 0 | Status: ACTIVE | COMMUNITY

## 2022-04-05 NOTE — HISTORY OF PRESENT ILLNESS
[FreeTextEntry1] : 66 year old female with a history of hypothyroidism, trigger finger, carpal tunnel syndrome, who presents for evaluation of joint pains.\par \par She reports pain in fingers, knees, shoulders, ankles, toes ongoing for a few years but worsened over the past 1 year. Pain is worse at nighttime in bed constant burning in feet, and also in the AM time pain is bad. Feels that she needs to stretch her body out. AM stiffness for 30 minutes, moving around makes symptoms better and resting/sitting makes symptoms worse. Swelling in hands, fingers, ankles. She takes oxycodone from pain management for knee osteoarthritis. Knee is apparently bone on bone. She saw Dr. Mckeon had hand x-rays that suggested degenerative changes.\par \par She has dry mouth and fatigue. She reports dyspnea on exertion and is following with cardiology\par Denies rash, photosensitivity, raynaud's, oral ulcers, nasal ulcers, hair loss, visual disturbances, dry eyes.\par \par Grandmother has RA

## 2022-04-05 NOTE — PHYSICAL EXAM
[General Appearance - Alert] : alert [General Appearance - In No Acute Distress] : in no acute distress [Sclera] : the sclera and conjunctiva were normal [PERRL With Normal Accommodation] : pupils were equal in size, round, and reactive to light [Extraocular Movements] : extraocular movements were intact [Outer Ear] : the ears and nose were normal in appearance [Oropharynx] : the oropharynx was normal [Neck Appearance] : the appearance of the neck was normal [Neck Cervical Mass (___cm)] : no neck mass was observed [Jugular Venous Distention Increased] : there was no jugular-venous distention [Thyroid Diffuse Enlargement] : the thyroid was not enlarged [Thyroid Nodule] : there were no palpable thyroid nodules [Auscultation Breath Sounds / Voice Sounds] : lungs were clear to auscultation bilaterally [Heart Rate And Rhythm] : heart rate was normal and rhythm regular [Heart Sounds] : normal S1 and S2 [Heart Sounds Gallop] : no gallops [Murmurs] : no murmurs [Heart Sounds Pericardial Friction Rub] : no pericardial rub [Bowel Sounds] : normal bowel sounds [Abdomen Soft] : soft [Abdomen Tenderness] : non-tender [Abdomen Mass (___ Cm)] : no abdominal mass palpated [Abnormal Walk] : normal gait [Nail Clubbing] : no clubbing  or cyanosis of the fingernails [Musculoskeletal - Swelling] : no joint swelling seen [Motor Tone] : muscle strength and tone were normal [Skin Lesions] : no skin lesions [] : no rash [Affect] : the affect was normal [Mood] : the mood was normal

## 2022-04-05 NOTE — ASSESSMENT
[FreeTextEntry1] : 66 year old female who presents for evaluation of joint pains.\par \par \par 1. Arthritis\par -Likely degenerative but will rule out inflammatory as she has pain in the AM with stiffness ~30 minutes. Typically for inflammatory arthritis AM stiffness is 60 minutes but will rule out RA and connective tissue disease given also her family history of RA in grandmother\par -Try meloxicam 15 mg daily as needed\par -Check x-ray hands and feet\par -Follow up in 2-3 weeks

## 2022-04-19 ENCOUNTER — APPOINTMENT (OUTPATIENT)
Dept: RHEUMATOLOGY | Facility: CLINIC | Age: 67
End: 2022-04-19
Payer: COMMERCIAL

## 2022-04-19 DIAGNOSIS — Z79.899 OTHER LONG TERM (CURRENT) DRUG THERAPY: ICD-10-CM

## 2022-04-19 DIAGNOSIS — M35.1 OTHER OVERLAP SYNDROMES: ICD-10-CM

## 2022-04-19 PROCEDURE — 99214 OFFICE O/P EST MOD 30 MIN: CPT

## 2022-04-19 NOTE — PHYSICAL EXAM
[General Appearance - Alert] : alert [General Appearance - In No Acute Distress] : in no acute distress [PERRL With Normal Accommodation] : pupils were equal in size, round, and reactive to light [Sclera] : the sclera and conjunctiva were normal [Extraocular Movements] : extraocular movements were intact [Outer Ear] : the ears and nose were normal in appearance [Neck Appearance] : the appearance of the neck was normal [Oropharynx] : the oropharynx was normal [Neck Cervical Mass (___cm)] : no neck mass was observed [Jugular Venous Distention Increased] : there was no jugular-venous distention [Thyroid Diffuse Enlargement] : the thyroid was not enlarged [Thyroid Nodule] : there were no palpable thyroid nodules [Auscultation Breath Sounds / Voice Sounds] : lungs were clear to auscultation bilaterally [Heart Rate And Rhythm] : heart rate was normal and rhythm regular [Heart Sounds Gallop] : no gallops [Heart Sounds] : normal S1 and S2 [Murmurs] : no murmurs [Heart Sounds Pericardial Friction Rub] : no pericardial rub [Bowel Sounds] : normal bowel sounds [Abdomen Soft] : soft [Abdomen Tenderness] : non-tender [Abdomen Mass (___ Cm)] : no abdominal mass palpated [Abnormal Walk] : normal gait [Nail Clubbing] : no clubbing  or cyanosis of the fingernails [Musculoskeletal - Swelling] : no joint swelling seen [Motor Tone] : muscle strength and tone were normal [] : no rash [Skin Lesions] : no skin lesions [Affect] : the affect was normal [Mood] : the mood was normal

## 2022-04-26 NOTE — ASSESSMENT
[FreeTextEntry1] : 66 year old female who presents for evaluation of joint pains.\par \par \par 1. Mixed connective tissue disease/possible SLE\par -Arthritis likely degenerative with inflammatory component as she has pain in the AM with stiffness ~30 minutes. \par -Labs show +RNP and indeterminant dsDNA\par -Start  mg BID r/b/a discussed ophtho referral given\par -F/u 3 months

## 2022-04-27 ENCOUNTER — APPOINTMENT (OUTPATIENT)
Dept: CARDIOLOGY | Facility: CLINIC | Age: 67
End: 2022-04-27
Payer: COMMERCIAL

## 2022-04-27 PROCEDURE — 93320 DOPPLER ECHO COMPLETE: CPT

## 2022-04-27 PROCEDURE — ZZZZZ: CPT

## 2022-04-27 PROCEDURE — 93325 DOPPLER ECHO COLOR FLOW MAPG: CPT

## 2022-04-27 PROCEDURE — 93880 EXTRACRANIAL BILAT STUDY: CPT

## 2022-04-27 PROCEDURE — 93351 STRESS TTE COMPLETE: CPT

## 2022-05-23 ENCOUNTER — RX RENEWAL (OUTPATIENT)
Age: 67
End: 2022-05-23

## 2022-07-18 ENCOUNTER — APPOINTMENT (OUTPATIENT)
Dept: RHEUMATOLOGY | Facility: CLINIC | Age: 67
End: 2022-07-18

## 2022-07-18 VITALS
SYSTOLIC BLOOD PRESSURE: 113 MMHG | HEIGHT: 60 IN | HEART RATE: 102 BPM | DIASTOLIC BLOOD PRESSURE: 78 MMHG | OXYGEN SATURATION: 97 % | BODY MASS INDEX: 32.98 KG/M2 | WEIGHT: 168 LBS

## 2022-07-18 PROCEDURE — 99214 OFFICE O/P EST MOD 30 MIN: CPT

## 2022-07-18 NOTE — PHYSICAL EXAM
[General Appearance - Alert] : alert [General Appearance - In No Acute Distress] : in no acute distress [Sclera] : the sclera and conjunctiva were normal [PERRL With Normal Accommodation] : pupils were equal in size, round, and reactive to light [Extraocular Movements] : extraocular movements were intact [Outer Ear] : the ears and nose were normal in appearance [Oropharynx] : the oropharynx was normal [Neck Appearance] : the appearance of the neck was normal [Neck Cervical Mass (___cm)] : no neck mass was observed [Jugular Venous Distention Increased] : there was no jugular-venous distention [Thyroid Diffuse Enlargement] : the thyroid was not enlarged [Thyroid Nodule] : there were no palpable thyroid nodules [Auscultation Breath Sounds / Voice Sounds] : lungs were clear to auscultation bilaterally [Heart Rate And Rhythm] : heart rate was normal and rhythm regular [Heart Sounds] : normal S1 and S2 [Heart Sounds Gallop] : no gallops [Murmurs] : no murmurs [Heart Sounds Pericardial Friction Rub] : no pericardial rub [Bowel Sounds] : normal bowel sounds [Abdomen Soft] : soft [Abdomen Tenderness] : non-tender [Abdomen Mass (___ Cm)] : no abdominal mass palpated [Abnormal Walk] : normal gait [Nail Clubbing] : no clubbing  or cyanosis of the fingernails [Musculoskeletal - Swelling] : no joint swelling seen [Motor Tone] : muscle strength and tone were normal [] : no rash [Skin Lesions] : no skin lesions [Affect] : the affect was normal [Mood] : the mood was normal

## 2022-07-18 NOTE — HISTORY OF PRESENT ILLNESS
[FreeTextEntry1] : 66 year old female with a history of hypothyroidism, trigger finger, carpal tunnel syndrome, who presents for evaluation of joint pains.\par \par 7/18/22:\par She has fingers, knees, feet, neck pain, low back pain. She is still all day.  Hands are swollen. Objects are dropping from her hands. Feet have burning sensation.  Nabumetone she stopped it was ineffective. She stopped HCQ because she read things online that concerned he regarding side effects of liver and kidney. It helped her then after 2 weeks it stopped. Meloxicam is stopped. \par \par \par \par 4/19/22:\par Patient here for follow up. Reports meloxicam helped partially with her joint pains but still persistent. \par \par Initial visit:\par She reports pain in fingers, knees, shoulders, ankles, toes ongoing for a few years but worsened over the past 1 year. Pain is worse at nighttime in bed constant burning in feet, and also in the AM time pain is bad. Feels that she needs to stretch her body out. AM stiffness for 30 minutes, moving around makes symptoms better and resting/sitting makes symptoms worse. Swelling in hands, fingers, ankles. She takes oxycodone from pain management for knee osteoarthritis. Knee is apparently bone on bone. She saw Dr. Mckeon had hand x-rays that suggested degenerative changes.\par \par She has dry mouth and fatigue. She reports dyspnea on exertion and is following with cardiology\par Denies rash, photosensitivity, raynaud's, oral ulcers, nasal ulcers, hair loss, visual disturbances, dry eyes.\par \par Grandmother has RA

## 2022-07-18 NOTE — ASSESSMENT
[FreeTextEntry1] : 66 year old female who presents for evaluation of joint pains.\par \par \par 1. Joint pains\par -Arthritis likely degenerative with inflammatory component as she has pain in the AM with stiffness ~30 minutes. \par -Labs show +RNP and indeterminant dsDNA. Repeat dsDNA negative\par -Re-start  mg BID r/b/a discussed ophtho referral given\par -Re-start meloxicam 15 mg/day PRN\par \par F/u 3 months

## 2022-10-18 ENCOUNTER — APPOINTMENT (OUTPATIENT)
Dept: RHEUMATOLOGY | Facility: CLINIC | Age: 67
End: 2022-10-18

## 2022-10-28 ENCOUNTER — APPOINTMENT (OUTPATIENT)
Dept: PLASTIC SURGERY | Facility: CLINIC | Age: 67
End: 2022-10-28

## 2022-12-14 NOTE — H&P PST ADULT - NS NEC GEN PE MLT EXAM PC
No bruits; no thyromegaly or nodules Isotretinoin Pregnancy And Lactation Text: This medication is Pregnancy Category X and is considered extremely dangerous during pregnancy. It is unknown if it is excreted in breast milk.

## 2023-04-20 ENCOUNTER — APPOINTMENT (OUTPATIENT)
Dept: CARDIOLOGY | Facility: CLINIC | Age: 68
End: 2023-04-20
Payer: MEDICARE

## 2023-04-20 VITALS
DIASTOLIC BLOOD PRESSURE: 72 MMHG | BODY MASS INDEX: 32.47 KG/M2 | HEART RATE: 94 BPM | SYSTOLIC BLOOD PRESSURE: 108 MMHG | WEIGHT: 172 LBS | HEIGHT: 61 IN | RESPIRATION RATE: 16 BRPM

## 2023-04-20 PROCEDURE — 93000 ELECTROCARDIOGRAM COMPLETE: CPT

## 2023-04-20 PROCEDURE — 99213 OFFICE O/P EST LOW 20 MIN: CPT

## 2023-04-20 NOTE — HISTORY OF PRESENT ILLNESS
[FreeTextEntry1] : 67-yo female with h/o hyperlipidemia and hypothyroidism. Abnormal ECG for several years.\par \par Patient presents for a follow up visit. She denies chest pain. She states she still has TOVAR but has not worsened. \par \par EST 04/27/23:\par 8.9 METs, no ischemia.\par \par LDL 79\par Trig 152\par GFR 74\par TSH 2.49.

## 2023-04-20 NOTE — PHYSICAL EXAM
[Well Developed] : well developed [Well Nourished] : well nourished [Normal Conjunctiva] : normal conjunctiva [No Acute Distress] : no acute distress [Normal Venous Pressure] : normal venous pressure [No Carotid Bruit] : no carotid bruit [Normal S1, S2] : normal S1, S2 [No Murmur] : no murmur [No Rub] : no rub [No Gallop] : no gallop [Clear Lung Fields] : clear lung fields [Good Air Entry] : good air entry [No Respiratory Distress] : no respiratory distress  [Soft] : abdomen soft [Non Tender] : non-tender [Normal Bowel Sounds] : normal bowel sounds [Normal Gait] : normal gait [No Edema] : no edema [No Cyanosis] : no cyanosis [No Clubbing] : no clubbing [No Varicosities] : no varicosities [No Rash] : no rash [Moves all extremities] : moves all extremities [No Focal Deficits] : no focal deficits [Normal Speech] : normal speech [Alert and Oriented] : alert and oriented [Normal memory] : normal memory

## 2023-04-20 NOTE — ASSESSMENT
[FreeTextEntry1] : 67 YOF\par TOVAR stable. Normal LVEF, no evidence of ischemia.\par HLD controlled.. \par \par Plan:\par Continue treatment.\par Weight loss discussed.\par Follow up in 6 months.\par \par Roger Zapata MD\par

## 2023-04-20 NOTE — CARDIOLOGY SUMMARY
[de-identified] : 4/20/23:\par SR, NSST changes. [de-identified] : Stress ECHO 12/06/19:\par 10 METs, no ischemia\par LVEF 60%, G1DD.

## 2023-04-20 NOTE — REVIEW OF SYSTEMS
[Dyspnea on exertion] : dyspnea during exertion [Negative] : Neurological [Chest Discomfort] : no chest discomfort [Lower Ext Edema] : no extremity edema [Leg Claudication] : no intermittent leg claudication [Palpitations] : no palpitations [Orthopnea] : no orthopnea [Syncope] : no syncope [Anxiety] : no anxiety

## 2023-04-21 ENCOUNTER — RESULT CHARGE (OUTPATIENT)
Age: 68
End: 2023-04-21

## 2023-11-02 ENCOUNTER — APPOINTMENT (OUTPATIENT)
Dept: CARDIOLOGY | Facility: CLINIC | Age: 68
End: 2023-11-02
Payer: MEDICARE

## 2023-11-02 VITALS
SYSTOLIC BLOOD PRESSURE: 120 MMHG | WEIGHT: 169 LBS | DIASTOLIC BLOOD PRESSURE: 88 MMHG | HEART RATE: 76 BPM | HEIGHT: 61 IN | BODY MASS INDEX: 31.91 KG/M2

## 2023-11-02 DIAGNOSIS — R94.31 ABNORMAL ELECTROCARDIOGRAM [ECG] [EKG]: ICD-10-CM

## 2023-11-02 DIAGNOSIS — R07.9 CHEST PAIN, UNSPECIFIED: ICD-10-CM

## 2023-11-02 PROCEDURE — 93000 ELECTROCARDIOGRAM COMPLETE: CPT

## 2023-11-02 PROCEDURE — 99214 OFFICE O/P EST MOD 30 MIN: CPT

## 2023-11-02 RX ORDER — DICLOFENAC SODIUM 1% 10 MG/G
1 GEL TOPICAL
Qty: 1 | Refills: 3 | Status: DISCONTINUED | COMMUNITY
Start: 2022-07-18 | End: 2023-11-02

## 2023-11-02 RX ORDER — MELOXICAM 15 MG/1
15 TABLET ORAL
Qty: 30 | Refills: 2 | Status: DISCONTINUED | COMMUNITY
Start: 2022-04-05 | End: 2023-11-02

## 2023-11-02 RX ORDER — CLOTRIMAZOLE AND BETAMETHASONE DIPROPIONATE 10; .5 MG/G; MG/G
1-0.05 CREAM TOPICAL
Qty: 60 | Refills: 0 | Status: ACTIVE | COMMUNITY
Start: 2023-09-07

## 2023-11-02 RX ORDER — LEVOTHYROXINE SODIUM 88 UG/1
88 TABLET ORAL DAILY
Refills: 0 | Status: ACTIVE | COMMUNITY

## 2023-11-02 RX ORDER — HYDROXYCHLOROQUINE SULFATE 200 MG/1
200 TABLET, FILM COATED ORAL TWICE DAILY
Qty: 60 | Refills: 2 | Status: DISCONTINUED | COMMUNITY
Start: 2022-04-19 | End: 2023-11-02

## 2023-11-16 ENCOUNTER — APPOINTMENT (OUTPATIENT)
Dept: CARDIOLOGY | Facility: CLINIC | Age: 68
End: 2023-11-16
Payer: MEDICARE

## 2023-11-16 PROCEDURE — 93306 TTE W/DOPPLER COMPLETE: CPT

## 2023-12-08 ENCOUNTER — OUTPATIENT (OUTPATIENT)
Dept: OUTPATIENT SERVICES | Facility: HOSPITAL | Age: 68
LOS: 1 days | End: 2023-12-08
Payer: MEDICARE

## 2023-12-08 ENCOUNTER — RESULT REVIEW (OUTPATIENT)
Age: 68
End: 2023-12-08

## 2023-12-08 DIAGNOSIS — Z00.8 ENCOUNTER FOR OTHER GENERAL EXAMINATION: ICD-10-CM

## 2023-12-08 DIAGNOSIS — R06.02 SHORTNESS OF BREATH: ICD-10-CM

## 2023-12-08 DIAGNOSIS — Z90.710 ACQUIRED ABSENCE OF BOTH CERVIX AND UTERUS: Chronic | ICD-10-CM

## 2023-12-08 PROCEDURE — 75574 CT ANGIO HRT W/3D IMAGE: CPT | Mod: 26,MH

## 2023-12-08 PROCEDURE — 75574 CT ANGIO HRT W/3D IMAGE: CPT

## 2023-12-09 DIAGNOSIS — R06.02 SHORTNESS OF BREATH: ICD-10-CM

## 2023-12-12 ENCOUNTER — RESULT REVIEW (OUTPATIENT)
Age: 68
End: 2023-12-12

## 2023-12-13 ENCOUNTER — OUTPATIENT (OUTPATIENT)
Dept: OUTPATIENT SERVICES | Facility: HOSPITAL | Age: 68
LOS: 1 days | End: 2023-12-13
Payer: MEDICARE

## 2023-12-13 DIAGNOSIS — Z90.710 ACQUIRED ABSENCE OF BOTH CERVIX AND UTERUS: Chronic | ICD-10-CM

## 2023-12-13 PROCEDURE — 0504T: CPT

## 2023-12-13 PROCEDURE — 0502T: CPT

## 2023-12-13 PROCEDURE — 0503T: CPT

## 2023-12-14 DIAGNOSIS — R06.02 SHORTNESS OF BREATH: ICD-10-CM

## 2023-12-15 DIAGNOSIS — R06.02 SHORTNESS OF BREATH: ICD-10-CM

## 2024-01-09 ENCOUNTER — NON-APPOINTMENT (OUTPATIENT)
Age: 69
End: 2024-01-09

## 2024-05-16 ENCOUNTER — APPOINTMENT (OUTPATIENT)
Dept: CARDIOLOGY | Facility: CLINIC | Age: 69
End: 2024-05-16
Payer: MEDICARE

## 2024-05-16 VITALS
HEIGHT: 61 IN | DIASTOLIC BLOOD PRESSURE: 80 MMHG | BODY MASS INDEX: 31.72 KG/M2 | SYSTOLIC BLOOD PRESSURE: 126 MMHG | WEIGHT: 168 LBS | HEART RATE: 75 BPM

## 2024-05-16 DIAGNOSIS — I25.10 ATHEROSCLEROTIC HEART DISEASE OF NATIVE CORONARY ARTERY W/OUT ANGINA PECTORIS: ICD-10-CM

## 2024-05-16 DIAGNOSIS — R06.02 SHORTNESS OF BREATH: ICD-10-CM

## 2024-05-16 DIAGNOSIS — E78.5 HYPERLIPIDEMIA, UNSPECIFIED: ICD-10-CM

## 2024-05-16 PROCEDURE — 93000 ELECTROCARDIOGRAM COMPLETE: CPT

## 2024-05-16 PROCEDURE — 99214 OFFICE O/P EST MOD 30 MIN: CPT

## 2024-05-16 RX ORDER — ROSUVASTATIN CALCIUM 20 MG/1
20 TABLET, FILM COATED ORAL
Qty: 90 | Refills: 1 | Status: ACTIVE | COMMUNITY
Start: 2016-10-07 | End: 1900-01-01

## 2024-05-16 RX ORDER — METOPROLOL TARTRATE 50 MG/1
50 TABLET, FILM COATED ORAL
Qty: 2 | Refills: 0 | Status: DISCONTINUED | COMMUNITY
Start: 2023-11-02 | End: 2024-05-16

## 2024-05-16 NOTE — CARDIOLOGY SUMMARY
[de-identified] : 5/16/24: SR, NSST changes. [de-identified] : EST 04/27/23: 8.9 METs, no ischemia.  [de-identified] : Stress ECHO 12/06/19:\par  10 METs, no ischemia\par  LVEF 60%, G1DD.

## 2024-05-16 NOTE — REVIEW OF SYSTEMS
[Dyspnea on exertion] : dyspnea during exertion [Chest Discomfort] : chest discomfort [Negative] : Neurological [Lower Ext Edema] : no extremity edema [Leg Claudication] : no intermittent leg claudication [Palpitations] : no palpitations [Orthopnea] : no orthopnea [Syncope] : no syncope [Anxiety] : no anxiety

## 2024-05-16 NOTE — ASSESSMENT
[FreeTextEntry1] : 68 YOF with h/o HLD. TOVAR. Obesity. Non-obstructive CAD.  Plan: Increase Rosuvastatin to 20 mg QHS. Diet, weight loss discussed. Repeat fasting blood work. Follow up in 6 months.  Roger Zapata MD

## 2024-05-16 NOTE — HISTORY OF PRESENT ILLNESS
[FreeTextEntry1] : 67-yo female with h/o hyperlipidemia and hypothyroidism. Abnormal ECG for several years.  Patient presents for a follow up visit. Denies chest pain. Still having TOVAR walking, climbing stairs, cleaning the house.   CCTA: Mild to moderate stenosis in the mid LAD and D1 ostium due to calcified plaque. CAC 67 FFR: No evidence of hemodynamically significant focal lesion.  2D ECHO: LVEF 60%,    GFR 69 TSH 1.90.

## 2024-08-21 ENCOUNTER — RX RENEWAL (OUTPATIENT)
Age: 69
End: 2024-08-21

## 2024-11-21 ENCOUNTER — APPOINTMENT (OUTPATIENT)
Dept: CARDIOLOGY | Facility: CLINIC | Age: 69
End: 2024-11-21
Payer: MEDICARE

## 2024-11-21 ENCOUNTER — NON-APPOINTMENT (OUTPATIENT)
Age: 69
End: 2024-11-21

## 2024-11-21 VITALS
HEART RATE: 86 BPM | HEIGHT: 61 IN | SYSTOLIC BLOOD PRESSURE: 120 MMHG | WEIGHT: 162 LBS | BODY MASS INDEX: 30.58 KG/M2 | DIASTOLIC BLOOD PRESSURE: 80 MMHG

## 2024-11-21 DIAGNOSIS — E78.5 HYPERLIPIDEMIA, UNSPECIFIED: ICD-10-CM

## 2024-11-21 DIAGNOSIS — R07.9 CHEST PAIN, UNSPECIFIED: ICD-10-CM

## 2024-11-21 DIAGNOSIS — I25.10 ATHEROSCLEROTIC HEART DISEASE OF NATIVE CORONARY ARTERY W/OUT ANGINA PECTORIS: ICD-10-CM

## 2024-11-21 DIAGNOSIS — R06.02 SHORTNESS OF BREATH: ICD-10-CM

## 2024-11-21 DIAGNOSIS — R73.03 PREDIABETES.: ICD-10-CM

## 2024-11-21 PROCEDURE — 93000 ELECTROCARDIOGRAM COMPLETE: CPT

## 2024-11-21 PROCEDURE — 99214 OFFICE O/P EST MOD 30 MIN: CPT

## 2024-11-21 RX ORDER — ASPIRIN ENTERIC COATED TABLETS 81 MG 81 MG/1
81 TABLET, DELAYED RELEASE ORAL DAILY
Qty: 90 | Refills: 2 | Status: ACTIVE | COMMUNITY
Start: 2024-11-21 | End: 1900-01-01

## 2024-11-21 RX ORDER — SEMAGLUTIDE 0.5 MG/.5ML
0.5 INJECTION, SOLUTION SUBCUTANEOUS
Refills: 0 | Status: ACTIVE | COMMUNITY

## 2025-02-05 ENCOUNTER — EMERGENCY (EMERGENCY)
Facility: HOSPITAL | Age: 70
LOS: 0 days | Discharge: HOME | End: 2025-02-05
Attending: EMERGENCY MEDICINE
Payer: MEDICARE

## 2025-02-05 VITALS
WEIGHT: 156.97 LBS | TEMPERATURE: 98 F | RESPIRATION RATE: 16 BRPM | OXYGEN SATURATION: 99 % | HEART RATE: 81 BPM | SYSTOLIC BLOOD PRESSURE: 135 MMHG | HEIGHT: 60 IN | DIASTOLIC BLOOD PRESSURE: 82 MMHG

## 2025-02-05 DIAGNOSIS — R10.11 RIGHT UPPER QUADRANT PAIN: ICD-10-CM

## 2025-02-05 DIAGNOSIS — Z90.710 ACQUIRED ABSENCE OF BOTH CERVIX AND UTERUS: Chronic | ICD-10-CM

## 2025-02-05 DIAGNOSIS — R11.0 NAUSEA: ICD-10-CM

## 2025-02-05 DIAGNOSIS — E03.9 HYPOTHYROIDISM, UNSPECIFIED: ICD-10-CM

## 2025-02-05 DIAGNOSIS — R10.31 RIGHT LOWER QUADRANT PAIN: ICD-10-CM

## 2025-02-05 DIAGNOSIS — K59.00 CONSTIPATION, UNSPECIFIED: ICD-10-CM

## 2025-02-05 DIAGNOSIS — Z90.710 ACQUIRED ABSENCE OF BOTH CERVIX AND UTERUS: ICD-10-CM

## 2025-02-05 LAB
ALBUMIN SERPL ELPH-MCNC: 4.3 G/DL — SIGNIFICANT CHANGE UP (ref 3.5–5.2)
ALP SERPL-CCNC: 76 U/L — SIGNIFICANT CHANGE UP (ref 30–115)
ALT FLD-CCNC: 10 U/L — SIGNIFICANT CHANGE UP (ref 0–41)
ANION GAP SERPL CALC-SCNC: 13 MMOL/L — SIGNIFICANT CHANGE UP (ref 7–14)
APPEARANCE UR: CLEAR — SIGNIFICANT CHANGE UP
AST SERPL-CCNC: 15 U/L — SIGNIFICANT CHANGE UP (ref 0–41)
BASOPHILS # BLD AUTO: 0.05 K/UL — SIGNIFICANT CHANGE UP (ref 0–0.2)
BASOPHILS NFR BLD AUTO: 0.7 % — SIGNIFICANT CHANGE UP (ref 0–1)
BILIRUB DIRECT SERPL-MCNC: <0.2 MG/DL — SIGNIFICANT CHANGE UP (ref 0–0.3)
BILIRUB INDIRECT FLD-MCNC: >0.2 MG/DL — SIGNIFICANT CHANGE UP (ref 0.2–1.2)
BILIRUB SERPL-MCNC: 0.4 MG/DL — SIGNIFICANT CHANGE UP (ref 0.2–1.2)
BILIRUB UR-MCNC: NEGATIVE — SIGNIFICANT CHANGE UP
BUN SERPL-MCNC: 12 MG/DL — SIGNIFICANT CHANGE UP (ref 10–20)
CALCIUM SERPL-MCNC: 9.5 MG/DL — SIGNIFICANT CHANGE UP (ref 8.4–10.4)
CHLORIDE SERPL-SCNC: 105 MMOL/L — SIGNIFICANT CHANGE UP (ref 98–110)
CO2 SERPL-SCNC: 23 MMOL/L — SIGNIFICANT CHANGE UP (ref 17–32)
COLOR SPEC: YELLOW — SIGNIFICANT CHANGE UP
CREAT SERPL-MCNC: 0.9 MG/DL — SIGNIFICANT CHANGE UP (ref 0.7–1.5)
DIFF PNL FLD: NEGATIVE — SIGNIFICANT CHANGE UP
EGFR: 70 ML/MIN/1.73M2 — SIGNIFICANT CHANGE UP
EOSINOPHIL # BLD AUTO: 0.08 K/UL — SIGNIFICANT CHANGE UP (ref 0–0.7)
EOSINOPHIL NFR BLD AUTO: 1.1 % — SIGNIFICANT CHANGE UP (ref 0–8)
GLUCOSE SERPL-MCNC: 99 MG/DL — SIGNIFICANT CHANGE UP (ref 70–99)
GLUCOSE UR QL: NEGATIVE MG/DL — SIGNIFICANT CHANGE UP
HCT VFR BLD CALC: 45 % — SIGNIFICANT CHANGE UP (ref 37–47)
HGB BLD-MCNC: 14.6 G/DL — SIGNIFICANT CHANGE UP (ref 12–16)
IMM GRANULOCYTES NFR BLD AUTO: 0.4 % — HIGH (ref 0.1–0.3)
KETONES UR-MCNC: NEGATIVE MG/DL — SIGNIFICANT CHANGE UP
LACTATE SERPL-SCNC: 0.8 MMOL/L — SIGNIFICANT CHANGE UP (ref 0.7–2)
LEUKOCYTE ESTERASE UR-ACNC: NEGATIVE — SIGNIFICANT CHANGE UP
LIDOCAIN IGE QN: 60 U/L — SIGNIFICANT CHANGE UP (ref 7–60)
LYMPHOCYTES # BLD AUTO: 1.46 K/UL — SIGNIFICANT CHANGE UP (ref 1.2–3.4)
LYMPHOCYTES # BLD AUTO: 19.3 % — LOW (ref 20.5–51.1)
MCHC RBC-ENTMCNC: 28.8 PG — SIGNIFICANT CHANGE UP (ref 27–31)
MCHC RBC-ENTMCNC: 32.4 G/DL — SIGNIFICANT CHANGE UP (ref 32–37)
MCV RBC AUTO: 88.8 FL — SIGNIFICANT CHANGE UP (ref 81–99)
MONOCYTES # BLD AUTO: 0.42 K/UL — SIGNIFICANT CHANGE UP (ref 0.1–0.6)
MONOCYTES NFR BLD AUTO: 5.6 % — SIGNIFICANT CHANGE UP (ref 1.7–9.3)
NEUTROPHILS # BLD AUTO: 5.52 K/UL — SIGNIFICANT CHANGE UP (ref 1.4–6.5)
NEUTROPHILS NFR BLD AUTO: 72.9 % — SIGNIFICANT CHANGE UP (ref 42.2–75.2)
NITRITE UR-MCNC: NEGATIVE — SIGNIFICANT CHANGE UP
NRBC # BLD: 0 /100 WBCS — SIGNIFICANT CHANGE UP (ref 0–0)
NRBC BLD-RTO: 0 /100 WBCS — SIGNIFICANT CHANGE UP (ref 0–0)
PH UR: 6 — SIGNIFICANT CHANGE UP (ref 5–8)
PLATELET # BLD AUTO: 217 K/UL — SIGNIFICANT CHANGE UP (ref 130–400)
PMV BLD: 11.4 FL — HIGH (ref 7.4–10.4)
POTASSIUM SERPL-MCNC: 4.1 MMOL/L — SIGNIFICANT CHANGE UP (ref 3.5–5)
POTASSIUM SERPL-SCNC: 4.1 MMOL/L — SIGNIFICANT CHANGE UP (ref 3.5–5)
PROT SERPL-MCNC: 6.8 G/DL — SIGNIFICANT CHANGE UP (ref 6–8)
PROT UR-MCNC: NEGATIVE MG/DL — SIGNIFICANT CHANGE UP
RBC # BLD: 5.07 M/UL — SIGNIFICANT CHANGE UP (ref 4.2–5.4)
RBC # FLD: 14 % — SIGNIFICANT CHANGE UP (ref 11.5–14.5)
SODIUM SERPL-SCNC: 141 MMOL/L — SIGNIFICANT CHANGE UP (ref 135–146)
SP GR SPEC: <1.005 — LOW (ref 1–1.03)
UROBILINOGEN FLD QL: 0.2 MG/DL — SIGNIFICANT CHANGE UP (ref 0.2–1)
WBC # BLD: 7.56 K/UL — SIGNIFICANT CHANGE UP (ref 4.8–10.8)
WBC # FLD AUTO: 7.56 K/UL — SIGNIFICANT CHANGE UP (ref 4.8–10.8)

## 2025-02-05 PROCEDURE — 96374 THER/PROPH/DIAG INJ IV PUSH: CPT | Mod: XU

## 2025-02-05 PROCEDURE — 99284 EMERGENCY DEPT VISIT MOD MDM: CPT | Mod: 25

## 2025-02-05 PROCEDURE — 99285 EMERGENCY DEPT VISIT HI MDM: CPT | Mod: FS

## 2025-02-05 PROCEDURE — 83690 ASSAY OF LIPASE: CPT

## 2025-02-05 PROCEDURE — 36415 COLL VENOUS BLD VENIPUNCTURE: CPT

## 2025-02-05 PROCEDURE — 80048 BASIC METABOLIC PNL TOTAL CA: CPT

## 2025-02-05 PROCEDURE — 83605 ASSAY OF LACTIC ACID: CPT

## 2025-02-05 PROCEDURE — 85025 COMPLETE CBC W/AUTO DIFF WBC: CPT

## 2025-02-05 PROCEDURE — 74177 CT ABD & PELVIS W/CONTRAST: CPT | Mod: 26

## 2025-02-05 PROCEDURE — 80076 HEPATIC FUNCTION PANEL: CPT

## 2025-02-05 PROCEDURE — 81003 URINALYSIS AUTO W/O SCOPE: CPT

## 2025-02-05 PROCEDURE — 74177 CT ABD & PELVIS W/CONTRAST: CPT

## 2025-02-05 PROCEDURE — 96375 TX/PRO/DX INJ NEW DRUG ADDON: CPT

## 2025-02-05 RX ORDER — MORPHINE SULFATE 60 MG/1
2 TABLET, FILM COATED, EXTENDED RELEASE ORAL ONCE
Refills: 0 | Status: DISCONTINUED | OUTPATIENT
Start: 2025-02-05 | End: 2025-02-05

## 2025-02-05 RX ORDER — SODIUM CHLORIDE 9 G/ML
1000 INJECTION, SOLUTION INTRAVENOUS ONCE
Refills: 0 | Status: COMPLETED | OUTPATIENT
Start: 2025-02-05 | End: 2025-02-05

## 2025-02-05 RX ORDER — ONDANSETRON 4 MG/1
4 TABLET, ORALLY DISINTEGRATING ORAL ONCE
Refills: 0 | Status: COMPLETED | OUTPATIENT
Start: 2025-02-05 | End: 2025-02-05

## 2025-02-05 RX ADMIN — MORPHINE SULFATE 2 MILLIGRAM(S): 60 TABLET, FILM COATED, EXTENDED RELEASE ORAL at 16:08

## 2025-02-05 RX ADMIN — ONDANSETRON 4 MILLIGRAM(S): 4 TABLET, ORALLY DISINTEGRATING ORAL at 16:08

## 2025-02-05 RX ADMIN — SODIUM CHLORIDE 1000 MILLILITER(S): 9 INJECTION, SOLUTION INTRAVENOUS at 16:08

## 2025-02-05 NOTE — ED PROVIDER NOTE - CARE PROVIDER_API CALL
Chan, Yeoman [facility]  Family Medicine  [address], [address]  [place] [address]  Phone: [phone]  Fax: [phone]  Follow Up Time: 1-3 Days

## 2025-02-05 NOTE — ED PROVIDER NOTE - PHYSICAL EXAMINATION
CONSTITUTIONAL: non-toxic appearing female, nad  SKIN: skin exam is warm and dry  ENT: MMM  CARD: S1, S2 normal, no murmur  RESP: No wheezes, rales or rhonchi. Good air movement bilaterally  ABD: soft; non-distended; +r sided abd tenderness  EXT: Normal ROM.   NEURO: awake, alert, following commands, oriented, grossly unremarkable. No Focal deficits. GCS 15.   PSYCH: Cooperative, appropriate.

## 2025-02-05 NOTE — ED PROVIDER NOTE - CLINICAL SUMMARY MEDICAL DECISION MAKING FREE TEXT BOX
Patient presents with right sided abdominal pain. labs, ua, ct done. no acute findings. Patient feeling better. Results discussed. Patient discharged with pmd follow up and return precautions.

## 2025-02-05 NOTE — ED PROVIDER NOTE - PATIENT PORTAL LINK FT
You can access the FollowMyHealth Patient Portal offered by [facility] by registering at the following website: [email or web address]. By joining [employer]’s FollowMyHealth portal, you will also be able to view your health information using other applications (apps) compatible with our system.

## 2025-02-05 NOTE — ED PROVIDER NOTE - ATTENDING APP SHARED VISIT CONTRIBUTION OF CARE
69-year-old female past medical history hypothyroid, hysterectomy presents with abdominal pain.  Patient reports her last 3 to 4 days she has been having right-sided abdominal pain.  Cramping in nature.  Located in upper and lower abdomen has been getting worse.  Patient saw her primary care physician today who was concerned and sent her to the ED for evaluation and imaging.  No fevers or chills.  Positive nausea.  No vomiting.  Also has been having some constipation.  Took some MiraLAX with a small bowel movement this morning.  No blood.  No urinary symptoms.  No similar episodes in the past.    CONSTITUTIONAL: Well-developed; well-nourished; in no acute distress.   SKIN: warm, dry  HEAD: Normocephalic; atraumatic.  EYES: PERRL, EOMI, no conjunctival erythema  ENT: No nasal discharge; airway clear.  NECK: Supple; non tender.  CARD: S1, S2 normal;  Regular rate and rhythm.   RESP: No wheezes, rales or rhonchi.  ABD: soft + RUQ, RLQ tenderness, no cva tenderness, non distended, no rebound or guarding  EXT: Normal ROM.  5/5 strength in all 4 extremities   LYMPH: No acute cervical adenopathy.  NEURO: Alert, oriented, grossly unremarkable. neurovascularly intact  PSYCH: Cooperative, appropriate.

## 2025-02-05 NOTE — ED PROVIDER NOTE - OBJECTIVE STATEMENT
69 year old female, past medical history hypothyroidism, who presents with abd pain. patient with right sided constant abdominal pain that began x4 days ago with associated nausea, no vomiting. patient with constipation, passing flatus, last BM this morning. denies f/c, vomiting, urinary symptoms, back pain. hx hysterectomy.

## 2025-02-05 NOTE — ED PROVIDER NOTE - NSFOLLOWUPINSTRUCTIONS_ED_ALL_ED_FT
Please follow up with your primary care doctor in 1-3 days  Please be aware of any new or worsening signs or symptoms that should prompt your return to the ER.    Abdominal Pain, Adult  Abdominal pain can be caused by many things. Often, abdominal pain is not serious and it gets better with no treatment or by being treated at home. However, sometimes abdominal pain is serious. Your health care provider will do a medical history and a physical exam to try to determine the cause of your abdominal pain.    Follow these instructions at home:  Take over-the-counter and prescription medicines only as told by your health care provider. Do not take a laxative unless told by your health care provider.  ImageDrink enough fluid to keep your urine clear or pale yellow.  Watch your condition for any changes.  Keep all follow-up visits as told by your health care provider. This is important.  Contact a health care provider if:  Your abdominal pain changes or gets worse.  You are not hungry or you lose weight without trying.  You are constipated or have diarrhea for more than 2–3 days.  You have pain when you urinate or have a bowel movement.  Your abdominal pain wakes you up at night.  Your pain gets worse with meals, after eating, or with certain foods.  You are throwing up and cannot keep anything down.  You have a fever.  Get help right away if:  Your pain does not go away as soon as your health care provider told you to expect.  You cannot stop throwing up.  Your pain is only in areas of the abdomen, such as the right side or the left lower portion of the abdomen.  You have bloody or black stools, or stools that look like tar.  You have severe pain, cramping, or bloating in your abdomen.  You have signs of dehydration, such as:    Dark urine, very little urine, or no urine.  Cracked lips.  Dry mouth.  Sunken eyes.  Sleepiness.  Weakness.    This information is not intended to replace advice given to you by your health care provider. Make sure you discuss any questions you have with your health care provider

## 2025-02-24 ENCOUNTER — APPOINTMENT (OUTPATIENT)
Dept: PLASTIC SURGERY | Facility: CLINIC | Age: 70
End: 2025-02-24
Payer: MEDICARE

## 2025-02-24 DIAGNOSIS — M25.542 PAIN IN JOINTS OF LEFT HAND: ICD-10-CM

## 2025-02-24 PROBLEM — R10.9 ABDOMINAL PAIN: Status: ACTIVE | Noted: 2025-02-24

## 2025-02-24 PROCEDURE — 99202 OFFICE O/P NEW SF 15 MIN: CPT

## 2025-02-25 ENCOUNTER — OUTPATIENT (OUTPATIENT)
Dept: OUTPATIENT SERVICES | Facility: HOSPITAL | Age: 70
LOS: 1 days | End: 2025-02-25
Payer: MEDICARE

## 2025-02-25 ENCOUNTER — RESULT REVIEW (OUTPATIENT)
Age: 70
End: 2025-02-25

## 2025-02-25 ENCOUNTER — APPOINTMENT (OUTPATIENT)
Dept: SURGERY | Facility: CLINIC | Age: 70
End: 2025-02-25
Payer: MEDICARE

## 2025-02-25 VITALS
OXYGEN SATURATION: 98 % | WEIGHT: 149 LBS | BODY MASS INDEX: 28.13 KG/M2 | TEMPERATURE: 97 F | HEART RATE: 97 BPM | DIASTOLIC BLOOD PRESSURE: 82 MMHG | SYSTOLIC BLOOD PRESSURE: 124 MMHG | HEIGHT: 61 IN

## 2025-02-25 DIAGNOSIS — M25.542 PAIN IN JOINTS OF LEFT HAND: ICD-10-CM

## 2025-02-25 DIAGNOSIS — R10.9 UNSPECIFIED ABDOMINAL PAIN: ICD-10-CM

## 2025-02-25 PROCEDURE — 99205 OFFICE O/P NEW HI 60 MIN: CPT

## 2025-02-25 PROCEDURE — 73130 X-RAY EXAM OF HAND: CPT | Mod: 50

## 2025-02-25 PROCEDURE — 73130 X-RAY EXAM OF HAND: CPT | Mod: 26,50

## 2025-02-26 DIAGNOSIS — M25.542 PAIN IN JOINTS OF LEFT HAND: ICD-10-CM

## 2025-02-27 ENCOUNTER — APPOINTMENT (OUTPATIENT)
Dept: CARDIOLOGY | Facility: CLINIC | Age: 70
End: 2025-02-27
Payer: MEDICARE

## 2025-02-27 PROCEDURE — 99212 OFFICE O/P EST SF 10 MIN: CPT | Mod: 93

## 2025-02-28 ENCOUNTER — OUTPATIENT (OUTPATIENT)
Dept: OUTPATIENT SERVICES | Facility: HOSPITAL | Age: 70
LOS: 1 days | End: 2025-02-28
Payer: MEDICARE

## 2025-02-28 VITALS
OXYGEN SATURATION: 99 % | WEIGHT: 151.9 LBS | TEMPERATURE: 98 F | SYSTOLIC BLOOD PRESSURE: 116 MMHG | RESPIRATION RATE: 17 BRPM | HEART RATE: 80 BPM | HEIGHT: 60 IN | DIASTOLIC BLOOD PRESSURE: 77 MMHG

## 2025-02-28 DIAGNOSIS — Z01.818 ENCOUNTER FOR OTHER PREPROCEDURAL EXAMINATION: ICD-10-CM

## 2025-02-28 DIAGNOSIS — Z98.890 OTHER SPECIFIED POSTPROCEDURAL STATES: Chronic | ICD-10-CM

## 2025-02-28 DIAGNOSIS — Z90.710 ACQUIRED ABSENCE OF BOTH CERVIX AND UTERUS: Chronic | ICD-10-CM

## 2025-02-28 DIAGNOSIS — K80.20 CALCULUS OF GALLBLADDER WITHOUT CHOLECYSTITIS WITHOUT OBSTRUCTION: ICD-10-CM

## 2025-02-28 PROCEDURE — 99214 OFFICE O/P EST MOD 30 MIN: CPT | Mod: 25

## 2025-02-28 NOTE — H&P PST ADULT - PRIMARY CARE PROVIDER
Addendum  created 06/19/24 1602 by Zak Beverly CRNA    Intraprocedure Meds edited, Orders acknowledged in Narrator       DR. YEOMAN CHAN

## 2025-02-28 NOTE — H&P PST ADULT - HISTORY OF PRESENT ILLNESS
PATIENT DENIES CHEST PAIN, SHORTNESS OF BREATH, PALPITATIONS, COUGHING, FEVER, DYSURIA.    NO COUGH, FEVER, SORE THROAT, HEADACHE, LOSS OF TASTE OR SMELL. NO KNOWN EXPOSURE TO ANYONE WITH COVID. PATIENT WAS INSTRUCTED TO ISOLATE FROM NOW UNTIL THE SURGERY.    Anesthesia Alert  NO--Difficult Airway  NO--History of neck surgery or radiation  NO--Limited ROM of neck  NO--History of Malignant hyperthermia  NO--Personal or family history of Pseudocholinesterase deficiency  NO--Prior Anesthesia Complication  NO--Latex Allergy  NO--Loose teeth  NO--History of Rheumatoid Arthritis  + BLAINE (CPAP)  NO-bleeding risk  Take Oxycodone on occasion    RCRI=1  DASI=9.89 METS

## 2025-02-28 NOTE — H&P PST ADULT - NSICDXPASTMEDICALHX_GEN_ALL_CORE_FT
PAST MEDICAL HISTORY:  Back pain     GERD (gastroesophageal reflux disease)     History of back pain     Hypercholesterolemia     Hypothyroidism     Mild CAD     OA (osteoarthritis)     BLAINE on CPAP     Seasonal allergies

## 2025-02-28 NOTE — H&P PST ADULT - REASON FOR ADMISSION
68 Y/O FEMALE HERE FOR PRE-ADMISSION SURGICAL TESTING. H/O NON-OBSTRUCTIVE CAD, HLD, GERD, HYPOTHYROIDISM AND BACK PAIN. PATIENT REPORTS SHE HAS ABD PAIN ON & OFF FOR YEARS. SHE STATES IN 2/5/25 SHE HAS SEVERE ABD PAIN AND WENT OT THE ER. WORKUP REVEALED + GALLSTONES. SHE RECENTLY LOST 20 POUNDS IN 3 MONTHS. SHE WAS ON WEYGOVY, BUT STOPPED ONE MONTH AGO.   NOW FOR SCHEDULED

## 2025-02-28 NOTE — H&P PST ADULT - NSICDXFAMILYHX_GEN_ALL_CORE_FT
FAMILY HISTORY:  FH: stomach cancer    Father  Still living? No  FH: dementia, Age at diagnosis: Age Unknown

## 2025-03-01 DIAGNOSIS — K80.20 CALCULUS OF GALLBLADDER WITHOUT CHOLECYSTITIS WITHOUT OBSTRUCTION: ICD-10-CM

## 2025-03-01 DIAGNOSIS — Z01.818 ENCOUNTER FOR OTHER PREPROCEDURAL EXAMINATION: ICD-10-CM

## 2025-03-04 ENCOUNTER — NON-APPOINTMENT (OUTPATIENT)
Age: 70
End: 2025-03-04

## 2025-03-04 PROBLEM — Z87.39 PERSONAL HISTORY OF OTHER DISEASES OF THE MUSCULOSKELETAL SYSTEM AND CONNECTIVE TISSUE: Chronic | Status: ACTIVE | Noted: 2025-02-28

## 2025-03-04 PROBLEM — J30.2 OTHER SEASONAL ALLERGIC RHINITIS: Chronic | Status: ACTIVE | Noted: 2025-02-28

## 2025-03-04 PROBLEM — I25.10 ATHEROSCLEROTIC HEART DISEASE OF NATIVE CORONARY ARTERY WITHOUT ANGINA PECTORIS: Chronic | Status: ACTIVE | Noted: 2025-02-28

## 2025-03-06 NOTE — ASU PATIENT PROFILE, ADULT - NSICDXPASTSURGICALHX_GEN_ALL_CORE_FT
PAST SURGICAL HISTORY:  Acquired trigger finger of both middle fingers     H/O wrist surgery     H/O: hysterectomy

## 2025-03-07 ENCOUNTER — TRANSCRIPTION ENCOUNTER (OUTPATIENT)
Age: 70
End: 2025-03-07

## 2025-03-07 ENCOUNTER — APPOINTMENT (OUTPATIENT)
Dept: SURGERY | Facility: HOSPITAL | Age: 70
End: 2025-03-07

## 2025-03-07 ENCOUNTER — OUTPATIENT (OUTPATIENT)
Dept: OUTPATIENT SERVICES | Facility: HOSPITAL | Age: 70
LOS: 1 days | Discharge: HOME | End: 2025-03-07
Payer: MEDICARE

## 2025-03-07 ENCOUNTER — RESULT REVIEW (OUTPATIENT)
Age: 70
End: 2025-03-07

## 2025-03-07 VITALS
DIASTOLIC BLOOD PRESSURE: 57 MMHG | OXYGEN SATURATION: 95 % | SYSTOLIC BLOOD PRESSURE: 119 MMHG | RESPIRATION RATE: 18 BRPM | HEART RATE: 73 BPM

## 2025-03-07 VITALS
WEIGHT: 151.9 LBS | RESPIRATION RATE: 18 BRPM | SYSTOLIC BLOOD PRESSURE: 115 MMHG | HEART RATE: 83 BPM | TEMPERATURE: 97 F | DIASTOLIC BLOOD PRESSURE: 71 MMHG

## 2025-03-07 DIAGNOSIS — Z90.710 ACQUIRED ABSENCE OF BOTH CERVIX AND UTERUS: Chronic | ICD-10-CM

## 2025-03-07 DIAGNOSIS — K80.20 CALCULUS OF GALLBLADDER WITHOUT CHOLECYSTITIS WITHOUT OBSTRUCTION: ICD-10-CM

## 2025-03-07 DIAGNOSIS — Z98.890 OTHER SPECIFIED POSTPROCEDURAL STATES: Chronic | ICD-10-CM

## 2025-03-07 DIAGNOSIS — M65.332 TRIGGER FINGER, LEFT MIDDLE FINGER: Chronic | ICD-10-CM

## 2025-03-07 PROCEDURE — 88304 TISSUE EXAM BY PATHOLOGIST: CPT

## 2025-03-07 PROCEDURE — S2900: CPT

## 2025-03-07 PROCEDURE — 47562 LAPAROSCOPIC CHOLECYSTECTOMY: CPT

## 2025-03-07 PROCEDURE — C1889: CPT

## 2025-03-07 PROCEDURE — C9399: CPT

## 2025-03-07 PROCEDURE — 88304 TISSUE EXAM BY PATHOLOGIST: CPT | Mod: 26

## 2025-03-07 RX ORDER — ONDANSETRON HCL/PF 4 MG/2 ML
4 VIAL (ML) INJECTION ONCE
Refills: 0 | Status: COMPLETED | OUTPATIENT
Start: 2025-03-07 | End: 2025-03-07

## 2025-03-07 RX ORDER — ASPIRIN 325 MG
1 TABLET ORAL
Refills: 0 | DISCHARGE

## 2025-03-07 RX ORDER — HYDROMORPHONE/SOD CHLOR,ISO/PF 2 MG/10 ML
0.5 SYRINGE (ML) INJECTION
Refills: 0 | Status: DISCONTINUED | OUTPATIENT
Start: 2025-03-07 | End: 2025-03-08

## 2025-03-07 RX ORDER — SODIUM CHLORIDE 9 G/1000ML
1000 INJECTION, SOLUTION INTRAVENOUS
Refills: 0 | Status: DISCONTINUED | OUTPATIENT
Start: 2025-03-07 | End: 2025-03-08

## 2025-03-07 RX ORDER — ROSUVASTATIN CALCIUM 20 MG/1
1 TABLET, FILM COATED ORAL
Refills: 0 | DISCHARGE

## 2025-03-07 RX ORDER — LEVOCETIRIZINE DIHYDROCHLORIDE 5 MG/1
1 TABLET ORAL
Refills: 0 | DISCHARGE

## 2025-03-07 RX ORDER — LEVOTHYROXINE SODIUM 300 MCG
1 TABLET ORAL
Refills: 0 | DISCHARGE

## 2025-03-07 RX ADMIN — Medication 0.5 MILLIGRAM(S): at 18:38

## 2025-03-07 RX ADMIN — Medication 0.5 MILLIGRAM(S): at 18:16

## 2025-03-07 RX ADMIN — Medication 4 MILLIGRAM(S): at 18:15

## 2025-03-07 NOTE — CHART NOTE - NSCHARTNOTEFT_GEN_A_CORE
PACU ANESTHESIA PACU ADMISSION NOTE      Procedure:  Post op diagnosis    ____ Intubated  TV:______       Rate: ______      FiO2: ______    __x__ Patent Airway    ____ Full return of protective reflexes    ____ Full recovery from anesthesia / sedation to baseline status    Viitals:  see anesthesia record          Mental Status:  __x__ Awake   _____ Alert   _____ Drowsy   _____ Sedated    Nausea/Vomiting: ____ Yes, See Post - Op Orders      __x__ No    Pain Scale (0-10): _____    Treatment: ____ None    _x___ See Post - Op/PCA Orders    Post - Operative Fluids:   ____ Oral   __x__ See Post - Op Orders    Plan:         Discharge:   __x__Home       _____Floor         _____Critical Care    _____Other:_________________    Comments: uneventful perioperative course; no s/s of anesthesia complications noted; D/C home when criteria met

## 2025-03-07 NOTE — BRIEF OPERATIVE NOTE - OPERATION/FINDINGS
critical view obtained, cystic duct clipped and sharply divided. Cystic artery clipped and divided with electrocautery

## 2025-03-07 NOTE — ASU DISCHARGE PLAN (ADULT/PEDIATRIC) - FINANCIAL ASSISTANCE
[facility] provides services at a reduced cost to those who are determined to be eligible through [facility]’s financial assistance program. Information regarding [facility]’s financial assistance program can be found by going to [email or web address] or by calling [phone].

## 2025-03-07 NOTE — ASU PREOP CHECKLIST - NSBLOODTRANS_GEN_A_CORE_SIUH
Please follow-up with the cardiologist as scheduled  Please call them   and let them know you were seen in the emergency department today  Come back to the emergency department if you have new or worsening of her symptoms, shortness of breath, or you pass out 
no...

## 2025-03-07 NOTE — ASU DISCHARGE PLAN (ADULT/PEDIATRIC) - NS MD DC FALL RISK RISK
For information on Fall & Injury Prevention, visit: [email or web address] OR  [email or web address] OR  https://www.cdc.gov/steadi/patient.html

## 2025-03-07 NOTE — ASU DISCHARGE PLAN (ADULT/PEDIATRIC) - CARE PROVIDER_API CALL
Meenu Singer  Complex General Surgical Oncology  [address], [address] [place] [address]  Phone: [phone]  Fax: [phone]  Follow Up Time: 2 weeks

## 2025-03-11 LAB — SURGICAL PATHOLOGY STUDY: SIGNIFICANT CHANGE UP

## 2025-03-14 DIAGNOSIS — I25.10 ATHEROSCLEROTIC HEART DISEASE OF NATIVE CORONARY ARTERY WITHOUT ANGINA PECTORIS: ICD-10-CM

## 2025-03-14 DIAGNOSIS — K80.10 CALCULUS OF GALLBLADDER WITH CHRONIC CHOLECYSTITIS WITHOUT OBSTRUCTION: ICD-10-CM

## 2025-03-14 DIAGNOSIS — K21.9 GASTRO-ESOPHAGEAL REFLUX DISEASE WITHOUT ESOPHAGITIS: ICD-10-CM

## 2025-03-14 DIAGNOSIS — K80.20 CALCULUS OF GALLBLADDER WITHOUT CHOLECYSTITIS WITHOUT OBSTRUCTION: ICD-10-CM

## 2025-03-14 DIAGNOSIS — G47.33 OBSTRUCTIVE SLEEP APNEA (ADULT) (PEDIATRIC): ICD-10-CM

## 2025-03-14 DIAGNOSIS — E78.00 PURE HYPERCHOLESTEROLEMIA, UNSPECIFIED: ICD-10-CM

## 2025-03-14 DIAGNOSIS — E03.9 HYPOTHYROIDISM, UNSPECIFIED: ICD-10-CM

## 2025-03-17 ENCOUNTER — APPOINTMENT (OUTPATIENT)
Dept: PLASTIC SURGERY | Facility: CLINIC | Age: 70
End: 2025-03-17

## 2025-03-17 ENCOUNTER — NON-APPOINTMENT (OUTPATIENT)
Age: 70
End: 2025-03-17

## 2025-03-19 PROBLEM — K80.10 CHOLELITHIASIS WITH CHOLECYSTITIS: Status: ACTIVE | Noted: 2025-03-19

## 2025-03-20 ENCOUNTER — APPOINTMENT (OUTPATIENT)
Dept: SURGERY | Facility: CLINIC | Age: 70
End: 2025-03-20
Payer: MEDICARE

## 2025-03-20 VITALS
DIASTOLIC BLOOD PRESSURE: 74 MMHG | WEIGHT: 149 LBS | OXYGEN SATURATION: 96 % | HEART RATE: 92 BPM | SYSTOLIC BLOOD PRESSURE: 118 MMHG | BODY MASS INDEX: 28.13 KG/M2 | HEIGHT: 61 IN

## 2025-03-20 DIAGNOSIS — K80.10 CALCULUS OF GALLBLADDER WITH CHRONIC CHOLECYSTITIS W/OUT OBSTRUCTION: ICD-10-CM

## 2025-03-20 DIAGNOSIS — R21 RASH AND OTHER NONSPECIFIC SKIN ERUPTION: ICD-10-CM

## 2025-03-20 PROCEDURE — 99024 POSTOP FOLLOW-UP VISIT: CPT

## 2025-03-20 RX ORDER — METHYLPREDNISOLONE 4 MG/1
4 TABLET ORAL
Qty: 21 | Refills: 0 | Status: ACTIVE | COMMUNITY
Start: 2025-03-20 | End: 1900-01-01

## 2025-03-26 NOTE — ASU PATIENT PROFILE, ADULT - HISTORY OF COVID-19 VACCINATION
RN called [facility] at [phone] and spoke to Darryl. RN shared info given by Geovanna: if cast was supposed to be removed and was told there was no schedule for the braces. Darryl states that Yola worked with patient last week but is gone for the day. Darryl will leave Yola a message to get back to me by tomorrow.   
show
Vaccine status unknown

## 2025-04-10 DIAGNOSIS — R10.9 UNSPECIFIED ABDOMINAL PAIN: ICD-10-CM

## 2025-04-11 ENCOUNTER — RESULT REVIEW (OUTPATIENT)
Age: 70
End: 2025-04-11

## 2025-04-11 ENCOUNTER — NON-APPOINTMENT (OUTPATIENT)
Age: 70
End: 2025-04-11

## 2025-04-11 ENCOUNTER — OUTPATIENT (OUTPATIENT)
Dept: OUTPATIENT SERVICES | Facility: HOSPITAL | Age: 70
LOS: 1 days | End: 2025-04-11
Payer: MEDICARE

## 2025-04-11 DIAGNOSIS — K80.10 CALCULUS OF GALLBLADDER WITH CHRONIC CHOLECYSTITIS WITHOUT OBSTRUCTION: ICD-10-CM

## 2025-04-11 DIAGNOSIS — Z98.890 OTHER SPECIFIED POSTPROCEDURAL STATES: Chronic | ICD-10-CM

## 2025-04-11 DIAGNOSIS — M65.332 TRIGGER FINGER, LEFT MIDDLE FINGER: Chronic | ICD-10-CM

## 2025-04-11 PROCEDURE — 74183 MRI ABD W/O CNTR FLWD CNTR: CPT

## 2025-04-11 PROCEDURE — 74183 MRI ABD W/O CNTR FLWD CNTR: CPT | Mod: 26

## 2025-04-11 PROCEDURE — A9579: CPT

## 2025-04-12 DIAGNOSIS — K80.10 CALCULUS OF GALLBLADDER WITH CHRONIC CHOLECYSTITIS WITHOUT OBSTRUCTION: ICD-10-CM

## 2025-04-12 DIAGNOSIS — R10.9 UNSPECIFIED ABDOMINAL PAIN: ICD-10-CM

## 2025-04-15 DIAGNOSIS — N63.10 UNSPECIFIED LUMP IN THE RIGHT BREAST, UNSPECIFIED QUADRANT: ICD-10-CM

## 2025-05-01 ENCOUNTER — APPOINTMENT (OUTPATIENT)
Dept: SURGERY | Facility: CLINIC | Age: 70
End: 2025-05-01
Payer: MEDICARE

## 2025-05-01 VITALS
HEART RATE: 78 BPM | DIASTOLIC BLOOD PRESSURE: 74 MMHG | SYSTOLIC BLOOD PRESSURE: 116 MMHG | BODY MASS INDEX: 35.3 KG/M2 | OXYGEN SATURATION: 97 % | WEIGHT: 187 LBS | HEIGHT: 61 IN | TEMPERATURE: 97 F

## 2025-05-01 VITALS — BODY MASS INDEX: 29.67 KG/M2 | WEIGHT: 157 LBS

## 2025-05-01 DIAGNOSIS — N63.10 UNSPECIFIED LUMP IN THE RIGHT BREAST, UNSPECIFIED QUADRANT: ICD-10-CM

## 2025-05-01 DIAGNOSIS — K80.10 CALCULUS OF GALLBLADDER WITH CHRONIC CHOLECYSTITIS W/OUT OBSTRUCTION: ICD-10-CM

## 2025-05-01 PROCEDURE — 99024 POSTOP FOLLOW-UP VISIT: CPT

## 2025-05-24 ENCOUNTER — NON-APPOINTMENT (OUTPATIENT)
Age: 70
End: 2025-05-24

## 2025-05-27 ENCOUNTER — RX RENEWAL (OUTPATIENT)
Age: 70
End: 2025-05-27

## 2025-06-03 ENCOUNTER — APPOINTMENT (OUTPATIENT)
Dept: CARDIOLOGY | Facility: CLINIC | Age: 70
End: 2025-06-03
Payer: MEDICARE

## 2025-06-03 VITALS
BODY MASS INDEX: 30.58 KG/M2 | SYSTOLIC BLOOD PRESSURE: 120 MMHG | WEIGHT: 162 LBS | HEART RATE: 75 BPM | HEIGHT: 61 IN | DIASTOLIC BLOOD PRESSURE: 62 MMHG

## 2025-06-03 DIAGNOSIS — R94.31 ABNORMAL ELECTROCARDIOGRAM [ECG] [EKG]: ICD-10-CM

## 2025-06-03 DIAGNOSIS — I25.10 ATHEROSCLEROTIC HEART DISEASE OF NATIVE CORONARY ARTERY W/OUT ANGINA PECTORIS: ICD-10-CM

## 2025-06-03 DIAGNOSIS — E78.5 HYPERLIPIDEMIA, UNSPECIFIED: ICD-10-CM

## 2025-06-03 DIAGNOSIS — R73.03 PREDIABETES.: ICD-10-CM

## 2025-06-03 PROCEDURE — 93000 ELECTROCARDIOGRAM COMPLETE: CPT

## 2025-06-03 PROCEDURE — 99214 OFFICE O/P EST MOD 30 MIN: CPT

## 2025-06-25 ENCOUNTER — APPOINTMENT (OUTPATIENT)
Dept: CARDIOLOGY | Facility: CLINIC | Age: 70
End: 2025-06-25
Payer: MEDICARE

## 2025-06-25 ENCOUNTER — NON-APPOINTMENT (OUTPATIENT)
Age: 70
End: 2025-06-25

## 2025-06-25 VITALS
WEIGHT: 162 LBS | BODY MASS INDEX: 31.8 KG/M2 | HEART RATE: 87 BPM | HEIGHT: 60 IN | DIASTOLIC BLOOD PRESSURE: 72 MMHG | SYSTOLIC BLOOD PRESSURE: 113 MMHG

## 2025-06-25 PROCEDURE — 99214 OFFICE O/P EST MOD 30 MIN: CPT

## 2025-06-25 RX ORDER — TIRZEPATIDE 2.5 MG/.5ML
2.5 INJECTION, SOLUTION SUBCUTANEOUS
Qty: 4 | Refills: 0 | Status: ACTIVE | COMMUNITY
Start: 2025-06-25 | End: 1900-01-01

## 2025-07-01 PROBLEM — Z71.3 DIETARY COUNSELING: Status: ACTIVE | Noted: 2025-07-01

## 2025-07-01 PROBLEM — E66.9 OBESITY, UNSPECIFIED CLASS, UNSPECIFIED OBESITY TYPE, UNSPECIFIED WHETHER SERIOUS COMORBIDITY PRESENT: Status: ACTIVE | Noted: 2025-06-25

## 2025-07-01 PROBLEM — G47.33 OBSTRUCTIVE SLEEP APNEA, ADULT: Status: ACTIVE | Noted: 2025-06-25

## 2025-07-01 PROBLEM — Z71.82 EXERCISE COUNSELING: Status: ACTIVE | Noted: 2025-07-01

## 2025-07-02 RX ORDER — SEMAGLUTIDE 0.25 MG/.5ML
0.25 INJECTION, SOLUTION SUBCUTANEOUS
Qty: 1 | Refills: 0 | Status: ACTIVE | COMMUNITY
Start: 2025-07-02 | End: 1900-01-01

## 2025-07-23 ENCOUNTER — APPOINTMENT (OUTPATIENT)
Dept: CARDIOLOGY | Facility: CLINIC | Age: 70
End: 2025-07-23
Payer: MEDICARE

## 2025-07-23 PROCEDURE — 99212 OFFICE O/P EST SF 10 MIN: CPT | Mod: 93

## 2025-08-19 RX ORDER — ONDANSETRON 4 MG/1
4 TABLET, ORALLY DISINTEGRATING ORAL EVERY 8 HOURS
Qty: 21 | Refills: 0 | Status: ACTIVE | COMMUNITY
Start: 2025-08-19 | End: 1900-01-01

## 2025-08-26 ENCOUNTER — NON-APPOINTMENT (OUTPATIENT)
Age: 70
End: 2025-08-26

## 2025-08-26 VITALS — WEIGHT: 166 LBS | BODY MASS INDEX: 32.42 KG/M2
